# Patient Record
Sex: MALE | Race: WHITE | NOT HISPANIC OR LATINO
[De-identification: names, ages, dates, MRNs, and addresses within clinical notes are randomized per-mention and may not be internally consistent; named-entity substitution may affect disease eponyms.]

---

## 2023-09-25 ENCOUNTER — APPOINTMENT (OUTPATIENT)
Dept: UROLOGY | Facility: CLINIC | Age: 63
End: 2023-09-25
Payer: COMMERCIAL

## 2023-09-25 VITALS
HEIGHT: 76 IN | TEMPERATURE: 98 F | DIASTOLIC BLOOD PRESSURE: 76 MMHG | RESPIRATION RATE: 18 BRPM | SYSTOLIC BLOOD PRESSURE: 157 MMHG | OXYGEN SATURATION: 96 % | HEART RATE: 88 BPM | WEIGHT: 300 LBS | BODY MASS INDEX: 36.53 KG/M2

## 2023-09-25 DIAGNOSIS — Z78.9 OTHER SPECIFIED HEALTH STATUS: ICD-10-CM

## 2023-09-25 PROCEDURE — 99204 OFFICE O/P NEW MOD 45 MIN: CPT | Mod: 25

## 2023-09-25 PROCEDURE — 51702 INSERT TEMP BLADDER CATH: CPT

## 2023-09-26 LAB
APPEARANCE: CLEAR
BACTERIA: NEGATIVE /HPF
BILIRUBIN URINE: NEGATIVE
BLOOD URINE: ABNORMAL
CAST: 0 /LPF
COLOR: YELLOW
EPITHELIAL CELLS: 1 /HPF
GLUCOSE QUALITATIVE U: NEGATIVE MG/DL
KETONES URINE: NEGATIVE MG/DL
LEUKOCYTE ESTERASE URINE: NEGATIVE
MICROSCOPIC-UA: NORMAL
NITRITE URINE: NEGATIVE
PH URINE: 5.5
PROTEIN URINE: NEGATIVE MG/DL
RED BLOOD CELLS URINE: 2 /HPF
SPECIFIC GRAVITY URINE: 1.01
UROBILINOGEN URINE: 0.2 MG/DL
WHITE BLOOD CELLS URINE: 2 /HPF

## 2023-09-27 ENCOUNTER — APPOINTMENT (OUTPATIENT)
Dept: INTERNAL MEDICINE | Facility: CLINIC | Age: 63
End: 2023-09-27

## 2023-09-28 LAB — BACTERIA UR CULT: NORMAL

## 2023-10-02 ENCOUNTER — APPOINTMENT (OUTPATIENT)
Dept: UROLOGY | Facility: CLINIC | Age: 63
End: 2023-10-02
Payer: COMMERCIAL

## 2023-10-02 VITALS
OXYGEN SATURATION: 96 % | BODY MASS INDEX: 36.53 KG/M2 | HEIGHT: 76 IN | DIASTOLIC BLOOD PRESSURE: 68 MMHG | TEMPERATURE: 98 F | RESPIRATION RATE: 17 BRPM | WEIGHT: 300 LBS | SYSTOLIC BLOOD PRESSURE: 102 MMHG | HEART RATE: 81 BPM

## 2023-10-02 PROCEDURE — 99213 OFFICE O/P EST LOW 20 MIN: CPT | Mod: 25

## 2023-10-02 PROCEDURE — 51702 INSERT TEMP BLADDER CATH: CPT

## 2023-10-03 ENCOUNTER — APPOINTMENT (OUTPATIENT)
Dept: CT IMAGING | Facility: IMAGING CENTER | Age: 63
End: 2023-10-03
Payer: COMMERCIAL

## 2023-10-03 ENCOUNTER — OUTPATIENT (OUTPATIENT)
Dept: OUTPATIENT SERVICES | Facility: HOSPITAL | Age: 63
LOS: 1 days | End: 2023-10-03
Payer: COMMERCIAL

## 2023-10-03 ENCOUNTER — APPOINTMENT (OUTPATIENT)
Dept: ULTRASOUND IMAGING | Facility: IMAGING CENTER | Age: 63
End: 2023-10-03
Payer: COMMERCIAL

## 2023-10-03 ENCOUNTER — APPOINTMENT (OUTPATIENT)
Dept: UROLOGY | Facility: CLINIC | Age: 63
End: 2023-10-03
Payer: COMMERCIAL

## 2023-10-03 VITALS
HEIGHT: 76 IN | RESPIRATION RATE: 16 BRPM | BODY MASS INDEX: 36.53 KG/M2 | HEART RATE: 114 BPM | SYSTOLIC BLOOD PRESSURE: 193 MMHG | DIASTOLIC BLOOD PRESSURE: 98 MMHG | WEIGHT: 300 LBS

## 2023-10-03 VITALS — SYSTOLIC BLOOD PRESSURE: 140 MMHG | DIASTOLIC BLOOD PRESSURE: 76 MMHG | HEART RATE: 94 BPM

## 2023-10-03 DIAGNOSIS — N40.1 BENIGN PROSTATIC HYPERPLASIA WITH LOWER URINARY TRACT SYMPTOMS: ICD-10-CM

## 2023-10-03 DIAGNOSIS — R10.9 UNSPECIFIED ABDOMINAL PAIN: ICD-10-CM

## 2023-10-03 DIAGNOSIS — N13.30 UNSPECIFIED HYDRONEPHROSIS: ICD-10-CM

## 2023-10-03 PROCEDURE — 74178 CT ABD&PLV WO CNTR FLWD CNTR: CPT | Mod: 26

## 2023-10-03 PROCEDURE — 76770 US EXAM ABDO BACK WALL COMP: CPT | Mod: 26

## 2023-10-03 PROCEDURE — 74178 CT ABD&PLV WO CNTR FLWD CNTR: CPT

## 2023-10-03 PROCEDURE — 76770 US EXAM ABDO BACK WALL COMP: CPT

## 2023-10-03 PROCEDURE — 82565 ASSAY OF CREATININE: CPT

## 2023-10-03 PROCEDURE — 99215 OFFICE O/P EST HI 40 MIN: CPT

## 2023-10-04 LAB
APPEARANCE: CLEAR
BACTERIA: NEGATIVE /HPF
BILIRUBIN URINE: NEGATIVE
BLOOD URINE: ABNORMAL
CAST: 2 /LPF
COLOR: YELLOW
EPITHELIAL CELLS: 0 /HPF
GLUCOSE QUALITATIVE U: NEGATIVE MG/DL
KETONES URINE: NEGATIVE MG/DL
LEUKOCYTE ESTERASE URINE: ABNORMAL
MICROSCOPIC-UA: NORMAL
NITRITE URINE: NEGATIVE
PH URINE: 6.5
PROTEIN URINE: NORMAL MG/DL
RED BLOOD CELLS URINE: 13 /HPF
SPECIFIC GRAVITY URINE: 1.01
UROBILINOGEN URINE: 0.2 MG/DL
WHITE BLOOD CELLS URINE: 4 /HPF

## 2023-10-06 PROBLEM — R10.9 ABDOMINAL PAIN: Status: ACTIVE | Noted: 2023-10-06

## 2023-10-06 PROBLEM — N13.30 HYDRONEPHROSIS: Status: ACTIVE | Noted: 2023-10-06

## 2023-10-06 LAB — BACTERIA UR CULT: ABNORMAL

## 2023-10-16 ENCOUNTER — APPOINTMENT (OUTPATIENT)
Dept: UROLOGY | Facility: CLINIC | Age: 63
End: 2023-10-16
Payer: COMMERCIAL

## 2023-10-16 VITALS
TEMPERATURE: 98.2 F | DIASTOLIC BLOOD PRESSURE: 60 MMHG | OXYGEN SATURATION: 97 % | HEART RATE: 92 BPM | SYSTOLIC BLOOD PRESSURE: 98 MMHG

## 2023-10-16 PROCEDURE — 51784 ANAL/URINARY MUSCLE STUDY: CPT

## 2023-10-16 PROCEDURE — 51728 CYSTOMETROGRAM W/VP: CPT

## 2023-10-30 ENCOUNTER — APPOINTMENT (OUTPATIENT)
Dept: UROLOGY | Facility: CLINIC | Age: 63
End: 2023-10-30
Payer: COMMERCIAL

## 2023-10-30 VITALS
RESPIRATION RATE: 16 BRPM | BODY MASS INDEX: 36.53 KG/M2 | TEMPERATURE: 98.4 F | WEIGHT: 300 LBS | DIASTOLIC BLOOD PRESSURE: 75 MMHG | SYSTOLIC BLOOD PRESSURE: 151 MMHG | OXYGEN SATURATION: 96 % | HEART RATE: 83 BPM | HEIGHT: 76 IN

## 2023-10-30 PROCEDURE — 51702 INSERT TEMP BLADDER CATH: CPT

## 2023-11-09 ENCOUNTER — APPOINTMENT (OUTPATIENT)
Dept: INTERNAL MEDICINE | Facility: CLINIC | Age: 63
End: 2023-11-09
Payer: COMMERCIAL

## 2023-11-09 ENCOUNTER — LABORATORY RESULT (OUTPATIENT)
Age: 63
End: 2023-11-09

## 2023-11-09 ENCOUNTER — NON-APPOINTMENT (OUTPATIENT)
Age: 63
End: 2023-11-09

## 2023-11-09 VITALS
OXYGEN SATURATION: 98 % | DIASTOLIC BLOOD PRESSURE: 75 MMHG | HEART RATE: 105 BPM | HEIGHT: 76 IN | BODY MASS INDEX: 27.89 KG/M2 | WEIGHT: 229 LBS | SYSTOLIC BLOOD PRESSURE: 120 MMHG | TEMPERATURE: 96 F

## 2023-11-09 DIAGNOSIS — Z00.00 ENCOUNTER FOR GENERAL ADULT MEDICAL EXAMINATION W/OUT ABNORMAL FINDINGS: ICD-10-CM

## 2023-11-09 DIAGNOSIS — R29.898 OTHER SYMPTOMS AND SIGNS INVOLVING THE MUSCULOSKELETAL SYSTEM: ICD-10-CM

## 2023-11-09 DIAGNOSIS — Z12.11 ENCOUNTER FOR SCREENING FOR MALIGNANT NEOPLASM OF COLON: ICD-10-CM

## 2023-11-09 DIAGNOSIS — M25.562 PAIN IN RIGHT KNEE: ICD-10-CM

## 2023-11-09 DIAGNOSIS — M25.561 PAIN IN RIGHT KNEE: ICD-10-CM

## 2023-11-09 PROCEDURE — 93000 ELECTROCARDIOGRAM COMPLETE: CPT | Mod: 59

## 2023-11-09 PROCEDURE — G0444 DEPRESSION SCREEN ANNUAL: CPT | Mod: 59

## 2023-11-09 PROCEDURE — 36415 COLL VENOUS BLD VENIPUNCTURE: CPT

## 2023-11-09 PROCEDURE — 99386 PREV VISIT NEW AGE 40-64: CPT | Mod: 25

## 2023-11-09 PROCEDURE — G0447 BEHAVIOR COUNSEL OBESITY 15M: CPT | Mod: 59

## 2023-11-20 ENCOUNTER — APPOINTMENT (OUTPATIENT)
Dept: INTERNAL MEDICINE | Facility: CLINIC | Age: 63
End: 2023-11-20
Payer: COMMERCIAL

## 2023-11-20 VITALS
DIASTOLIC BLOOD PRESSURE: 77 MMHG | BODY MASS INDEX: 28.74 KG/M2 | HEART RATE: 85 BPM | SYSTOLIC BLOOD PRESSURE: 162 MMHG | OXYGEN SATURATION: 96 % | HEIGHT: 76 IN | WEIGHT: 236 LBS

## 2023-11-20 DIAGNOSIS — E55.9 VITAMIN D DEFICIENCY, UNSPECIFIED: ICD-10-CM

## 2023-11-20 DIAGNOSIS — R73.03 PREDIABETES.: ICD-10-CM

## 2023-11-20 LAB
25(OH)D3 SERPL-MCNC: 17.1 NG/ML
ALBUMIN SERPL ELPH-MCNC: 3.8 G/DL
ALP BLD-CCNC: 56 U/L
ALT SERPL-CCNC: 28 U/L
ANION GAP SERPL CALC-SCNC: 13 MMOL/L
APPEARANCE: ABNORMAL
AST SERPL-CCNC: 26 U/L
BASOPHILS # BLD AUTO: 0.07 K/UL
BASOPHILS NFR BLD AUTO: 1.3 %
BILIRUB SERPL-MCNC: 0.8 MG/DL
BILIRUBIN URINE: NEGATIVE
BLOOD URINE: ABNORMAL
BUN SERPL-MCNC: 19 MG/DL
CALCIUM SERPL-MCNC: 9.4 MG/DL
CHLORIDE SERPL-SCNC: 99 MMOL/L
CHOLEST SERPL-MCNC: 232 MG/DL
CO2 SERPL-SCNC: 26 MMOL/L
COLOR: YELLOW
CREAT SERPL-MCNC: 1.07 MG/DL
EGFR: 78 ML/MIN/1.73M2
EOSINOPHIL # BLD AUTO: 0.15 K/UL
EOSINOPHIL NFR BLD AUTO: 2.8 %
ESTIMATED AVERAGE GLUCOSE: 114 MG/DL
FERRITIN SERPL-MCNC: 609 NG/ML
GLUCOSE QUALITATIVE U: NEGATIVE MG/DL
GLUCOSE SERPL-MCNC: 82 MG/DL
HBA1C MFR BLD HPLC: 5.6 %
HCT VFR BLD CALC: 41.1 %
HDLC SERPL-MCNC: 49 MG/DL
HGB BLD-MCNC: 13 G/DL
IMM GRANULOCYTES NFR BLD AUTO: 0.4 %
KETONES URINE: NEGATIVE MG/DL
LDLC SERPL CALC-MCNC: 172 MG/DL
LEUKOCYTE ESTERASE URINE: ABNORMAL
LYMPHOCYTES # BLD AUTO: 1.21 K/UL
LYMPHOCYTES NFR BLD AUTO: 22.4 %
MAN DIFF?: NORMAL
MCHC RBC-ENTMCNC: 29.1 PG
MCHC RBC-ENTMCNC: 31.6 GM/DL
MCV RBC AUTO: 92.2 FL
MONOCYTES # BLD AUTO: 0.36 K/UL
MONOCYTES NFR BLD AUTO: 6.7 %
NEUTROPHILS # BLD AUTO: 3.59 K/UL
NEUTROPHILS NFR BLD AUTO: 66.4 %
NITRITE URINE: NEGATIVE
NONHDLC SERPL-MCNC: 183 MG/DL
PH URINE: 6
PLATELET # BLD AUTO: 355 K/UL
POTASSIUM SERPL-SCNC: 4.6 MMOL/L
PROT SERPL-MCNC: 7.4 G/DL
PROTEIN URINE: 100 MG/DL
PSA SERPL-MCNC: 4.39 NG/ML
RBC # BLD: 4.46 M/UL
RBC # FLD: 13.7 %
SODIUM SERPL-SCNC: 138 MMOL/L
SPECIFIC GRAVITY URINE: 1.02
TRIGL SERPL-MCNC: 63 MG/DL
TSH SERPL-ACNC: 1.54 UIU/ML
UROBILINOGEN URINE: 1 MG/DL
VIT B12 SERPL-MCNC: 501 PG/ML
WBC # FLD AUTO: 5.4 K/UL

## 2023-11-20 PROCEDURE — 99214 OFFICE O/P EST MOD 30 MIN: CPT

## 2023-11-27 ENCOUNTER — APPOINTMENT (OUTPATIENT)
Dept: UROLOGY | Facility: CLINIC | Age: 63
End: 2023-11-27

## 2023-11-27 VITALS
HEIGHT: 76 IN | TEMPERATURE: 97.7 F | DIASTOLIC BLOOD PRESSURE: 70 MMHG | OXYGEN SATURATION: 97 % | WEIGHT: 236 LBS | BODY MASS INDEX: 28.74 KG/M2 | HEART RATE: 85 BPM | SYSTOLIC BLOOD PRESSURE: 119 MMHG

## 2023-12-20 ENCOUNTER — APPOINTMENT (OUTPATIENT)
Dept: UROLOGY | Facility: CLINIC | Age: 63
End: 2023-12-20
Payer: COMMERCIAL

## 2023-12-20 VITALS
TEMPERATURE: 97.8 F | WEIGHT: 236 LBS | RESPIRATION RATE: 16 BRPM | BODY MASS INDEX: 28.74 KG/M2 | DIASTOLIC BLOOD PRESSURE: 69 MMHG | OXYGEN SATURATION: 99 % | SYSTOLIC BLOOD PRESSURE: 140 MMHG | HEIGHT: 76 IN | HEART RATE: 102 BPM

## 2023-12-20 DIAGNOSIS — R33.9 RETENTION OF URINE, UNSPECIFIED: ICD-10-CM

## 2023-12-20 PROCEDURE — 51797 INTRAABDOMINAL PRESSURE TEST: CPT

## 2023-12-20 PROCEDURE — 51741 ELECTRO-UROFLOWMETRY FIRST: CPT

## 2023-12-20 PROCEDURE — 51728 CYSTOMETROGRAM W/VP: CPT

## 2023-12-20 PROCEDURE — 51784 ANAL/URINARY MUSCLE STUDY: CPT

## 2024-01-17 ENCOUNTER — APPOINTMENT (OUTPATIENT)
Dept: UROLOGY | Facility: CLINIC | Age: 64
End: 2024-01-17
Payer: COMMERCIAL

## 2024-01-17 VITALS
DIASTOLIC BLOOD PRESSURE: 82 MMHG | OXYGEN SATURATION: 96 % | HEART RATE: 78 BPM | SYSTOLIC BLOOD PRESSURE: 163 MMHG | TEMPERATURE: 97.7 F

## 2024-01-17 PROCEDURE — 51702 INSERT TEMP BLADDER CATH: CPT

## 2024-01-22 ENCOUNTER — APPOINTMENT (OUTPATIENT)
Dept: UROLOGY | Facility: CLINIC | Age: 64
End: 2024-01-22
Payer: COMMERCIAL

## 2024-01-22 VITALS
SYSTOLIC BLOOD PRESSURE: 149 MMHG | WEIGHT: 236 LBS | DIASTOLIC BLOOD PRESSURE: 68 MMHG | RESPIRATION RATE: 17 BRPM | TEMPERATURE: 97.8 F | HEART RATE: 97 BPM | HEIGHT: 76 IN | BODY MASS INDEX: 28.74 KG/M2

## 2024-01-22 DIAGNOSIS — R33.9 RETENTION OF URINE, UNSPECIFIED: ICD-10-CM

## 2024-01-22 PROCEDURE — 99214 OFFICE O/P EST MOD 30 MIN: CPT

## 2024-01-28 PROBLEM — R33.9 INCOMPLETE EMPTYING OF BLADDER: Status: ACTIVE | Noted: 2023-10-06

## 2024-01-28 NOTE — HISTORY OF PRESENT ILLNESS
[FreeTextEntry1] : Referred by Dr. Patten solitary kidney with stones BPH, urinary retention Has indwelling Beckwith catheter PSA 4.39  nov 2023  I personally reviewed labs and images and discussed all pertinent findings with patient. CT: 70cc prostate; solitary Left kidney with multiple large stones  (1) Urinary retention Discussed Laser enucleation of prostate with morcellation (HOLEP/ThuLEP).  Indications, options including chronic Beckwith catheter, suprapubic catheter, intermittent self-catheterization, transurethral resection of prostate, transurethral vaporization of prostate with laser or electrocautery, open or laparoscopic enucleation of the prostate, all discussed.  Potential complications of transurethral holmium or thulium laser enucleation of prostate with morcellation discussed. This included risk of infection, bleeding, transfusion, conversion to open enucleation, need for staged procedure, adjacent organ injury, bladder injury, clot retention of urine requiring return to operating room for cystoscopy clot evacuation, prolonged duration of Beckwith catheterization, urethral stricture, transient or permanent urinary incontinence, retrograde ejaculation is a permanent and irreversible complication, redo or staged surgery due to equipment malfunction, anesthetic complications, cardiac complications, all discussed.   Printed information including of the above and other more uncommon complications provided to patient.  We'll schedule.  (2) Solitary kidney with stones Will need PCNL He wants to do after prostate enucleation will revisit after prostate enucleation

## 2024-02-07 ENCOUNTER — OUTPATIENT (OUTPATIENT)
Dept: OUTPATIENT SERVICES | Facility: HOSPITAL | Age: 64
LOS: 1 days | End: 2024-02-07
Payer: COMMERCIAL

## 2024-02-07 ENCOUNTER — APPOINTMENT (OUTPATIENT)
Dept: UROLOGY | Facility: CLINIC | Age: 64
End: 2024-02-07
Payer: COMMERCIAL

## 2024-02-07 VITALS
SYSTOLIC BLOOD PRESSURE: 117 MMHG | DIASTOLIC BLOOD PRESSURE: 77 MMHG | HEIGHT: 76 IN | HEART RATE: 79 BPM | RESPIRATION RATE: 16 BRPM | WEIGHT: 233.03 LBS | OXYGEN SATURATION: 98 % | TEMPERATURE: 99 F

## 2024-02-07 VITALS
TEMPERATURE: 98 F | WEIGHT: 236 LBS | HEIGHT: 76 IN | SYSTOLIC BLOOD PRESSURE: 118 MMHG | BODY MASS INDEX: 28.74 KG/M2 | HEART RATE: 84 BPM | DIASTOLIC BLOOD PRESSURE: 76 MMHG | RESPIRATION RATE: 16 BRPM | OXYGEN SATURATION: 98 %

## 2024-02-07 DIAGNOSIS — R33.9 RETENTION OF URINE, UNSPECIFIED: ICD-10-CM

## 2024-02-07 DIAGNOSIS — Z01.818 ENCOUNTER FOR OTHER PREPROCEDURAL EXAMINATION: ICD-10-CM

## 2024-02-07 DIAGNOSIS — E78.5 HYPERLIPIDEMIA, UNSPECIFIED: ICD-10-CM

## 2024-02-07 DIAGNOSIS — N40.1 BENIGN PROSTATIC HYPERPLASIA WITH LOWER URINARY TRACT SYMPTOMS: ICD-10-CM

## 2024-02-07 LAB
ANION GAP SERPL CALC-SCNC: 10 MMOL/L — SIGNIFICANT CHANGE UP (ref 5–17)
BLD GP AB SCN SERPL QL: NEGATIVE — SIGNIFICANT CHANGE UP
BUN SERPL-MCNC: 20 MG/DL — SIGNIFICANT CHANGE UP (ref 7–23)
CALCIUM SERPL-MCNC: 9.4 MG/DL — SIGNIFICANT CHANGE UP (ref 8.4–10.5)
CHLORIDE SERPL-SCNC: 102 MMOL/L — SIGNIFICANT CHANGE UP (ref 96–108)
CO2 SERPL-SCNC: 27 MMOL/L — SIGNIFICANT CHANGE UP (ref 22–31)
CREAT SERPL-MCNC: 1.09 MG/DL — SIGNIFICANT CHANGE UP (ref 0.5–1.3)
EGFR: 77 ML/MIN/1.73M2 — SIGNIFICANT CHANGE UP
GLUCOSE SERPL-MCNC: 92 MG/DL — SIGNIFICANT CHANGE UP (ref 70–99)
HCT VFR BLD CALC: 42.9 % — SIGNIFICANT CHANGE UP (ref 39–50)
HGB BLD-MCNC: 13.6 G/DL — SIGNIFICANT CHANGE UP (ref 13–17)
MCHC RBC-ENTMCNC: 28.8 PG — SIGNIFICANT CHANGE UP (ref 27–34)
MCHC RBC-ENTMCNC: 31.7 GM/DL — LOW (ref 32–36)
MCV RBC AUTO: 90.9 FL — SIGNIFICANT CHANGE UP (ref 80–100)
NRBC # BLD: 0 /100 WBCS — SIGNIFICANT CHANGE UP (ref 0–0)
PLATELET # BLD AUTO: 239 K/UL — SIGNIFICANT CHANGE UP (ref 150–400)
POTASSIUM SERPL-MCNC: 4.2 MMOL/L — SIGNIFICANT CHANGE UP (ref 3.5–5.3)
POTASSIUM SERPL-SCNC: 4.2 MMOL/L — SIGNIFICANT CHANGE UP (ref 3.5–5.3)
RBC # BLD: 4.72 M/UL — SIGNIFICANT CHANGE UP (ref 4.2–5.8)
RBC # FLD: 12.9 % — SIGNIFICANT CHANGE UP (ref 10.3–14.5)
RH IG SCN BLD-IMP: POSITIVE — SIGNIFICANT CHANGE UP
SODIUM SERPL-SCNC: 139 MMOL/L — SIGNIFICANT CHANGE UP (ref 135–145)
WBC # BLD: 5.35 K/UL — SIGNIFICANT CHANGE UP (ref 3.8–10.5)
WBC # FLD AUTO: 5.35 K/UL — SIGNIFICANT CHANGE UP (ref 3.8–10.5)

## 2024-02-07 PROCEDURE — 51702 INSERT TEMP BLADDER CATH: CPT

## 2024-02-07 PROCEDURE — 80048 BASIC METABOLIC PNL TOTAL CA: CPT

## 2024-02-07 PROCEDURE — 87086 URINE CULTURE/COLONY COUNT: CPT

## 2024-02-07 PROCEDURE — 85027 COMPLETE CBC AUTOMATED: CPT

## 2024-02-07 PROCEDURE — 86850 RBC ANTIBODY SCREEN: CPT

## 2024-02-07 PROCEDURE — 86901 BLOOD TYPING SEROLOGIC RH(D): CPT

## 2024-02-07 PROCEDURE — G0463: CPT

## 2024-02-07 PROCEDURE — 86900 BLOOD TYPING SEROLOGIC ABO: CPT

## 2024-02-07 NOTE — H&P PST ADULT - NSICDXPASTMEDICALHX_GEN_ALL_CORE_FT
PAST MEDICAL HISTORY:  BPH with urinary obstruction     Beckwith catheter in place     History of urinary retention     Hyperlipidemia

## 2024-02-07 NOTE — H&P PST ADULT - PROBLEM SELECTOR PLAN 1
Pt scheduled for cystoscopy, laser enucleation of prostate with morcellation with Dr. Pema Navarro on 2/20/2024  -Pre op instructions provided. Pt scheduled for cystoscopy, laser enucleation of prostate with morcellation with Dr. Pema Navarro on 2/20/2024  -Pre op instructions provided.  -LABS: CBC, BMP, T&S, urine culture done at [facility].  He will follow up with PMD for clearance.  PT seeing Neurologist in April 2024 for recent imbalance (since catheter was placed back in September 2023).  Pt will be starting on Cipro 3 days prior to procedure and will finish course of abx after surgery.

## 2024-02-07 NOTE — H&P PST ADULT - HISTORY OF PRESENT ILLNESS
64y/o M, PMHx of born with 1 kidney, BPH with obstruction, urinary retention with indwelling jiménez catheter, and HLD (on Atorvastatin), prediabetes (recent bloodwork 11/2023 - HgbA1c 5.6%.  Pt denies any other medical condition.  Pt presents for PST for cystoscopy, laser enucleation of prostate with morcellation with Dr. Pema Navarro on 2/20/2024.  Pt denies any fever, chills, N/V/D, SOB, CP, dizziness, HA, or BM issues.  Jiménez catheter in place. 62y/o M, PMHx of born with 1 kidney (incidental finding from CT scan), BPH with obstruction, had gross hematuria, urinary retention with indwelling jiménez catheter (since September 2023), and HLD (on Atorvastatin).  Since having catheter in place, some balance issue and ambulating with cane. Pt will be seeing a neurologist in April 2024.  PT has multiple kidney stones.  Pt denies any other medical condition.  Denies any pain or discomfort.  Pt presents for PST for cystoscopy, laser enucleation of prostate with morcellation with Dr. Pema Navarro on 2/20/2024.  Pt denies any fever, chills, N/V/D, SOB, CP, dizziness, HA, or BM issues.  Jiménez catheter in place.  Denies any dysuria, urgency, frequency, hematuria.  He will be going to urologist today for catheter change (urinary obstruction).    ***PT seeing Neurologist in April 2024 for recent imbalance (since catheter was placed back in September 2023).  Pt will be starting on Cipro 3 days prior to procedure and will finish course of abx after surgery. 64y/o M, PMHx of born with 1 kidney (incidental finding from CT scan), BPH with obstruction, had gross hematuria, urinary retention with indwelling jiménez catheter (since September 2023), and HLD (on Atorvastatin).  Since having catheter in place, some balance issue and ambulating with cane. Pt will be seeing a neurologist in April 2024.  PT has multiple kidney stones.  Denies any pain or discomfort.  PT have been losing weight.  He is not diabetic and 11/2023 HgbA1c 5.6%.  Pt presents for PST for cystoscopy, laser enucleation of prostate with morcellation with Dr. Pema Navarro on 2/20/2024.  Pt denies any fever, chills, N/V/D, SOB, CP, dizziness, HA, or BM issues.  Jiménez catheter in place.  Denies any dysuria, urgency, frequency, hematuria.  He will be going to urologist today for catheter change (urinary obstruction).    ***PT seeing Neurologist in April 2024 for recent imbalance (since catheter was placed back in September 2023).  Pt will be starting on Cipro 3 days prior to procedure and will finish course of abx after surgery. 64y/o M, PMH solitary left kidney (incidental finding from CT scan), BPH with obstruction, had gross hematuria, urinary retention with indwelling jiménez catheter (since September 2023), and HLD (on Atorvastatin).  Since having catheter in place, some balance issue and ambulating with cane. Pt will be seeing a neurologist in April 2024.  PT has multiple kidney stones.  Denies any pain or discomfort.  PT have been losing weight.  He is not diabetic and 11/2023 HgbA1c 5.6%.  Pt presents for PST for cystoscopy, laser enucleation of prostate with morcellation with Dr. Pema Navarro on 2/20/2024.  Pt denies any fever, chills, N/V/D, SOB, CP, dizziness, HA, or BM issues.  Jiménez catheter in place.  Denies any dysuria, urgency, frequency, hematuria.  He will be going to urologist today for catheter change (urinary obstruction).    ***PT seeing Neurologist in April 2024 for recent imbalance (since catheter was placed back in September 2023).  Pt will be starting on Cipro 3 days prior to procedure and will finish course of abx after surgery.

## 2024-02-07 NOTE — H&P PST ADULT - NEGATIVE GENERAL GENITOURINARY SYMPTOMS
Beckwith catheter in place.  Hx of urinary retention, Hx of BPH/no hematuria/no renal colic/no flank pain L/no flank pain R/no bladder infections

## 2024-02-07 NOTE — H&P PST ADULT - ASSESSMENT
DASI score: 6.70  DASI activity: able to walk 2-3 blocks, incline/stairs, shopping, carry groceries, house chores, self care without SOB, CP.  Loose teeth or denture: denies any loose teeth, implants, dentures

## 2024-02-08 ENCOUNTER — NON-APPOINTMENT (OUTPATIENT)
Age: 64
End: 2024-02-08

## 2024-02-09 ENCOUNTER — NON-APPOINTMENT (OUTPATIENT)
Age: 64
End: 2024-02-09

## 2024-02-09 ENCOUNTER — EMERGENCY (EMERGENCY)
Facility: HOSPITAL | Age: 64
LOS: 1 days | Discharge: HOME | End: 2024-02-09
Attending: STUDENT IN AN ORGANIZED HEALTH CARE EDUCATION/TRAINING PROGRAM
Payer: COMMERCIAL

## 2024-02-09 VITALS
HEART RATE: 72 BPM | SYSTOLIC BLOOD PRESSURE: 166 MMHG | TEMPERATURE: 98 F | RESPIRATION RATE: 18 BRPM | DIASTOLIC BLOOD PRESSURE: 81 MMHG | OXYGEN SATURATION: 99 %

## 2024-02-09 VITALS
OXYGEN SATURATION: 100 % | HEART RATE: 70 BPM | DIASTOLIC BLOOD PRESSURE: 81 MMHG | SYSTOLIC BLOOD PRESSURE: 162 MMHG | RESPIRATION RATE: 22 BRPM | TEMPERATURE: 98 F | HEIGHT: 76 IN | WEIGHT: 315 LBS

## 2024-02-09 PROBLEM — Z97.8 PRESENCE OF OTHER SPECIFIED DEVICES: Chronic | Status: ACTIVE | Noted: 2024-02-07

## 2024-02-09 PROBLEM — E78.5 HYPERLIPIDEMIA, UNSPECIFIED: Chronic | Status: ACTIVE | Noted: 2024-02-07

## 2024-02-09 PROBLEM — N40.1 BENIGN PROSTATIC HYPERPLASIA WITH LOWER URINARY TRACT SYMPTOMS: Chronic | Status: ACTIVE | Noted: 2024-02-07

## 2024-02-09 PROBLEM — Z87.898 PERSONAL HISTORY OF OTHER SPECIFIED CONDITIONS: Chronic | Status: ACTIVE | Noted: 2024-02-07

## 2024-02-09 LAB
APPEARANCE UR: ABNORMAL
BACTERIA # UR AUTO: ABNORMAL /HPF
BILIRUB UR-MCNC: NEGATIVE — SIGNIFICANT CHANGE UP
CAST: 1 /LPF — SIGNIFICANT CHANGE UP (ref 0–4)
COLOR SPEC: YELLOW — SIGNIFICANT CHANGE UP
CULTURE RESULTS: SIGNIFICANT CHANGE UP
DIFF PNL FLD: ABNORMAL
GLUCOSE UR QL: NEGATIVE MG/DL — SIGNIFICANT CHANGE UP
KETONES UR-MCNC: NEGATIVE MG/DL — SIGNIFICANT CHANGE UP
LEUKOCYTE ESTERASE UR-ACNC: ABNORMAL
NITRITE UR-MCNC: POSITIVE
PH UR: 6.5 — SIGNIFICANT CHANGE UP (ref 5–8)
PROT UR-MCNC: SIGNIFICANT CHANGE UP MG/DL
RBC CASTS # UR COMP ASSIST: 285 /HPF — HIGH (ref 0–4)
SP GR SPEC: 1.02 — SIGNIFICANT CHANGE UP (ref 1–1.03)
SPECIMEN SOURCE: SIGNIFICANT CHANGE UP
SQUAMOUS # UR AUTO: 0 /HPF — SIGNIFICANT CHANGE UP (ref 0–5)
UROBILINOGEN FLD QL: 0.2 MG/DL — SIGNIFICANT CHANGE UP (ref 0.2–1)
WBC UR QL: 335 /HPF — HIGH (ref 0–5)

## 2024-02-09 PROCEDURE — 87186 SC STD MICRODIL/AGAR DIL: CPT

## 2024-02-09 PROCEDURE — 87077 CULTURE AEROBIC IDENTIFY: CPT

## 2024-02-09 PROCEDURE — 99284 EMERGENCY DEPT VISIT MOD MDM: CPT

## 2024-02-09 PROCEDURE — 87086 URINE CULTURE/COLONY COUNT: CPT

## 2024-02-09 PROCEDURE — 81001 URINALYSIS AUTO W/SCOPE: CPT

## 2024-02-09 RX ORDER — CEFPODOXIME PROXETIL 100 MG
1 TABLET ORAL
Qty: 20 | Refills: 0
Start: 2024-02-09 | End: 2024-02-18

## 2024-02-09 RX ORDER — CEFPODOXIME PROXETIL 100 MG
100 TABLET ORAL ONCE
Refills: 0 | Status: COMPLETED | OUTPATIENT
Start: 2024-02-09 | End: 2024-02-09

## 2024-02-09 RX ADMIN — Medication 100 MILLIGRAM(S): at 02:57

## 2024-02-09 NOTE — ED PROVIDER NOTE - PROGRESS NOTE DETAILS
UA nitrite and leuk esterase positive.  Will treat patient for UTI.  Will send cefpodoxime to pharmacy.  Given patient return precautions and follow-up instructions with teach back.  Plan for DC. Leobardo Salazar, ED Attending

## 2024-02-09 NOTE — ED PROVIDER NOTE - NSFOLLOWUPINSTRUCTIONS_ED_ALL_ED_FT
See attached information on Beckwith catheter care.      Follow-up with your urologist in the next 2 to 3 days as discussed.      Return to the emergency room for any new or worsening symptoms.       your antibiotics and take as directed.     Indwelling Urinary Catheter Care, Adult    An indwelling urinary catheter is a thin, flexible tube that is placed into the bladder to help drain urine out of the body. The catheter is inserted into the urethra. The urethra is the part of the body that drains urine from the bladder. Urine drains from the catheter into a drainage bag outside of the body.    Taking good care of your catheter will keep it working properly and help to prevent problems from developing.    What are the risks?  Bacteria may get into your bladder and cause a urinary tract infection.  Urine flow can become blocked. This can happen if the catheter is not working correctly, or if you have sediment or a blood clot in your bladder or catheter.  Tissue near the catheter may become irritated and may bleed.  How to wear your catheter and your drainage bag  Supplies needed    Adhesive tape or a leg strap.  Alcohol wipe or soap and water (if you use tape).  A clean towel (if you use tape).  Overnight drainage bag.  Smaller drainage bag (leg bag).  Wearing your catheter and bag    Use adhesive tape or a leg strap to attach your catheter to your leg.  Make sure the catheter is not pulled tight.  If a leg strap gets wet, replace it with a dry one.  If you use adhesive tape:  Use an alcohol wipe or soap and water to wash off any stickiness on your skin where you had tape before.  Use a clean towel to pat-dry the area.  Apply the new tape.  You should have received a large overnight drainage bag and a smaller leg bag that fits underneath clothing.  You may wear the overnight bag at any time, but you should not wear the leg bag at night.  Make sure the overnight drainage bag is always lower than the level of your bladder, but do not let it touch the floor. Before you go to sleep, hang the bag inside a wastebasket that is covered by a clean plastic bag.  Secure the leg bag according to 's instructions. This may be above or below the knee, depending on the length of the tubing. Make sure that:  The leg bag is below the bladder.  The tubing does not have loops or too much tension.  How to care for the skin around the catheter  Supplies needed    A clean washcloth.  Water and mild soap.  A clean towel.  Caring for your skin and catheter    Female anatomy showing the labia, urethra, and an indwelling urinary catheter in the bladder.  Male anatomy showing the penis, urethra, and an indwelling urinary catheter in the bladder.  Every day, use a clean washcloth and soapy water to clean the skin around your catheter.  Wash your hands with soap and water.  Wet a washcloth in warm water and mild soap.  Clean the skin around your urethra.  If you are female:  Use one hand to gently spread the folds of skin around your vagina (labia).  With the washcloth in your other hand, wipe the inner side of your labia on each side. Do this in a front-to-back direction.  If you are male:  Use one hand to pull back any skin that covers the end of your penis (foreskin).  With the washcloth in your other hand, wipe your penis in small circles. Start wiping at the tip of your penis, then move outward from the catheter.  Move the foreskin back in place, if needed.  With your free hand, hold the catheter close to where it enters your body. Keep holding the catheter during cleaning so it does not get pulled out.  Use your other hand to clean the catheter with the washcloth.  Only wipe downward on the catheter, toward the bag.  Do not wipe upward toward your body, because that may push bacteria into your urethra and cause infection.  Use a clean towel to pat-dry the catheter and the skin around it. Make sure to wipe off all soap.  Wash your hands with soap and water.  Shower every day. Do not take baths.  Do not use cream, ointment, or lotion on the area where the catheter enters your body, unless your health care provider tells you to do that.  Do not use powders, sprays, or lotions on your genital area.  Check your skin around the catheter every day for signs of infection. Check for:  Redness, swelling, or pain.  Fluid or blood.  Warmth.  Pus or a bad smell.  How to empty the drainage bag  Supplies needed    Rubbing alcohol.  Gauze pad or cotton ball.  Adhesive tape or a leg strap.  Emptying the bag    Empty your drainage bag (your overnight drainage bag or your leg bag) when it is ?–½ full, or at least 2–3 times a day. Clean the drainage bag according to the 's instructions or as told by your health care provider.  Wash your hands with soap and water.  Detach the drainage bag from your leg.  Hold the drainage bag over the toilet or a clean container. Make sure the drainage bag is lower than your hips and bladder. This stops urine from going back into the tubing and into your bladder.  Open the pour spout at the bottom of the bag.  Empty the urine into the toilet or container. Do not let the pour spout touch any surface. This precaution is important to prevent bacteria from getting in the bag and causing infection.  Apply rubbing alcohol to a gauze pad or cotton ball.  Use the gauze pad or cotton ball to clean the pour spout.  Close the pour spout.  Attach the bag to your leg with adhesive tape or a leg strap.  Wash your hands with soap and water.  How to change the drainage bag  Supplies needed:    Alcohol wipes.  A clean drainage bag.  Adhesive tape or a leg strap.  Changing the bag    Replace your drainage bag with a clean bag if it leaks, starts to smell bad, or looks dirty.  Wash your hands with soap and water.  Detach the dirty drainage bag from your leg.  Pinch the catheter with your fingers so that urine does not spill out.  Disconnect the catheter tube from the drainage tube at the connection valve. Do not let the tubes touch any surface.  Clean the end of the catheter tube with an alcohol wipe. Use a different alcohol wipe to clean the end of the drainage tube.  Connect the catheter tube to the drainage tube of the clean bag.  Attach the clean bag to your leg with adhesive tape or a leg strap. Avoid attaching the new bag too tightly.  Wash your hands with soap and water.  General instructions  A person washing hands with soap and water.  Never pull on your catheter or try to remove it. Pulling can damage your internal tissues.  Always wash your hands before and after you handle your catheter or drainage bag. Use a mild, fragrance-free soap. If soap and water are not available, use hand .  Always make sure there are no twists, bends, or kinks in the catheter tube.  Always make sure there are no leaks in the catheter or drainage bag.  Drink enough fluid to keep your urine pale yellow.  Do not take baths, swim, or use a hot tub.  If you are female, wipe from front to back after having a bowel movement.  Contact a health care provider if:  Your catheter gets clogged.  Your catheter starts to leak.  You have signs of infection at the catheter site, such as:  Redness, swelling, or pain where the catheter enters your body.  Fluid, blood, pus, or a bad smell coming from the area where the catheter enters your body.  The area where the catheter enters your body feels warm to the touch.  You have signs of a urinary tract infection, such as:  Fever or chills.  Urine smells unusually bad.  Cloudy urine.  Pain in your abdomen, legs, lower back, or bladder.  Nausea or vomiting.  Get help right away if:  You see blood in the catheter.  Your urine is pink or red.  Your bladder feels full.  Your urine is not draining into the bag.  Your catheter gets pulled out.  Summary  An indwelling urinary catheter is a thin, flexible tube that is placed into the bladder to help drain urine out of the body.  The catheter is inserted into the part of the body that drains urine from the bladder (urethra).  Take good care of your catheter to keep it working properly and help prevent problems from developing.  Always wash your hands before and after you handle your catheter or drainage bag.  Never pull on your catheter or try to remove it.  This information is not intended to replace advice given to you by your health care provider. Make sure you discuss any questions you have with your health care provider.

## 2024-02-09 NOTE — ED PROVIDER NOTE - OBJECTIVE STATEMENT
63-year-old male with past medical history of hyperlipidemia, BPH, plan for TURP later this month presenting with chief complaint of Beckwith bag not draining.  States that he just had the catheter placed yesterday at urology office.  Has been Beckwith dependent for the past 4 months.  States that the bag stopped draining around sometime in the evening and started developing suprapubic pressure.  Denies any flank pain, abdominal pain, nausea, vomiting, fevers or chills.  No other complaints at this time.

## 2024-02-09 NOTE — ED PROVIDER NOTE - PATIENT PORTAL LINK FT
You can access the FollowMyHealth Patient Portal offered by Health system by registering at the following website: [email or web address]. By joining [facility]’s FollowMyHealth portal, you will also be able to view your health information using other applications (apps) compatible with our system.

## 2024-02-09 NOTE — ED ADULT NURSE REASSESSMENT NOTE - NS ED NURSE REASSESS COMMENT FT1
Indwelling urinary "jiménez" catheter inserted using sterile technique. Procedure, risks, and benefits of catheter explained to patient, patient verbalized understanding. Second RN present to confirm sterility. Pt tolerated well. Urinary catheter drained clear le urine, no clots visualized. Bedside drainage to gravity.

## 2024-02-09 NOTE — ED PROVIDER NOTE - PHYSICAL EXAMINATION
Const: Well-nourished, Well-developed, appearing stated age.  Eyes: no conjunctival injection, and symmetrical lids.  HEENT: Head NCAT, no lesions. Atraumatic external nose and ears.   Neck: Symmetric, trachea midline.   CVS: +S1/S2, Radial and DP pulses 2+ b/l.   RESP: Unlabored respiratory effort. Clear to auscultation bilaterally.  GI: +suprapubic ttp, No CVA tenderness b/l.   MSK: Normocephalic/Atraumatic, Lower Extremities w/o edema b/l.   Skin: Warm, dry and intact.   Neuro: Fluent Speech. Moving all extremities.   Psych: Awake, Alert, & Oriented (AAO) x3. Appropriate mood and affect.

## 2024-02-09 NOTE — ED ADULT NURSE NOTE - OBJECTIVE STATEMENT
64 y/o M with PMHx of BPH presents to the ED with complaints of chronic jiménez catheter not draining. Patient has chronic jiménez since September 2023, notes had jiménez replaced yesterday at urology office, however notes jiménez stopped draining at approximately 1800 last night and since developed suprapubic pressure and hematuria. AOx4 and speaking coherently. Breathing is unlabored, spontaneous, and symmetrical. Bladder is distended and tender to palpation. <2s capillary refill.

## 2024-02-09 NOTE — ED PROVIDER NOTE - CLINICAL SUMMARY MEDICAL DECISION MAKING FREE TEXT BOX
61-year-old male presenting with urinary retention.  States that Beckwith catheter is no longer draining.  On exam, vital signs stable, mild reproducible suprapubic tenderness to palpation.  On bedside POCUS, significant urinary retention.  No evidence of clotting, balloon not clearly visualized inside the bladder.  Plan on exchanging Beckwith.  Will send off UA UC to assess for infection.  Reassess to dispo.

## 2024-02-12 ENCOUNTER — APPOINTMENT (OUTPATIENT)
Dept: INTERNAL MEDICINE | Facility: CLINIC | Age: 64
End: 2024-02-12
Payer: COMMERCIAL

## 2024-02-12 ENCOUNTER — APPOINTMENT (OUTPATIENT)
Dept: UROLOGY | Facility: CLINIC | Age: 64
End: 2024-02-12

## 2024-02-12 VITALS
HEART RATE: 98 BPM | TEMPERATURE: 96 F | WEIGHT: 238 LBS | DIASTOLIC BLOOD PRESSURE: 70 MMHG | BODY MASS INDEX: 28.98 KG/M2 | HEIGHT: 76 IN | OXYGEN SATURATION: 98 % | SYSTOLIC BLOOD PRESSURE: 110 MMHG

## 2024-02-12 DIAGNOSIS — E78.5 HYPERLIPIDEMIA, UNSPECIFIED: ICD-10-CM

## 2024-02-12 DIAGNOSIS — Z01.818 ENCOUNTER FOR OTHER PREPROCEDURAL EXAMINATION: ICD-10-CM

## 2024-02-12 LAB
-  AMOXICILLIN/CLAVULANIC ACID: SIGNIFICANT CHANGE UP
-  AMPICILLIN/SULBACTAM: SIGNIFICANT CHANGE UP
-  AMPICILLIN: SIGNIFICANT CHANGE UP
-  AZTREONAM: SIGNIFICANT CHANGE UP
-  CEFAZOLIN: SIGNIFICANT CHANGE UP
-  CEFEPIME: SIGNIFICANT CHANGE UP
-  CEFOXITIN: SIGNIFICANT CHANGE UP
-  CEFTRIAXONE: SIGNIFICANT CHANGE UP
-  CIPROFLOXACIN: SIGNIFICANT CHANGE UP
-  ERTAPENEM: SIGNIFICANT CHANGE UP
-  GENTAMICIN: SIGNIFICANT CHANGE UP
-  IMIPENEM: SIGNIFICANT CHANGE UP
-  LEVOFLOXACIN: SIGNIFICANT CHANGE UP
-  MEROPENEM: SIGNIFICANT CHANGE UP
-  NITROFURANTOIN: SIGNIFICANT CHANGE UP
-  PIPERACILLIN/TAZOBACTAM: SIGNIFICANT CHANGE UP
-  TOBRAMYCIN: SIGNIFICANT CHANGE UP
-  TRIMETHOPRIM/SULFAMETHOXAZOLE: SIGNIFICANT CHANGE UP
CULTURE RESULTS: ABNORMAL
METHOD TYPE: SIGNIFICANT CHANGE UP
ORGANISM # SPEC MICROSCOPIC CNT: ABNORMAL
ORGANISM # SPEC MICROSCOPIC CNT: ABNORMAL
SPECIMEN SOURCE: SIGNIFICANT CHANGE UP

## 2024-02-12 PROCEDURE — 99214 OFFICE O/P EST MOD 30 MIN: CPT

## 2024-02-12 NOTE — HISTORY OF PRESENT ILLNESS
[No Pertinent Cardiac History] : no history of aortic stenosis, atrial fibrillation, coronary artery disease, recent myocardial infarction, or implantable device/pacemaker [No Pertinent Pulmonary History] : no history of asthma, COPD, sleep apnea, or smoking [No Adverse Anesthesia Reaction] : no adverse anesthesia reaction in self or family member [Chronic Anticoagulation] : no chronic anticoagulation [Chronic Kidney Disease] : no chronic kidney disease [Diabetes] : no diabetes [(Patient denies any chest pain, claudication, dyspnea on exertion, orthopnea, palpitations or syncope)] : Patient denies any chest pain, claudication, dyspnea on exertion, orthopnea, palpitations or syncope [FreeTextEntry1] : PROSTATE [FreeTextEntry2] : 02/20/2024 [FreeTextEntry3] : DR KENT [FreeTextEntry4] : HAS UTI AND ON ANTIBIOTICS AND AS PER SURG ITS OK

## 2024-02-12 NOTE — PHYSICAL EXAM
[Normal TMs] : both tympanic membranes were normal [Supple] : supple [Clear to Auscultation] : lungs were clear to auscultation bilaterally [Normal S1, S2] : normal S1 and S2 [Normal] : affect was normal and insight and judgment were intact

## 2024-02-19 ENCOUNTER — TRANSCRIPTION ENCOUNTER (OUTPATIENT)
Age: 64
End: 2024-02-19

## 2024-02-20 ENCOUNTER — RESULT REVIEW (OUTPATIENT)
Age: 64
End: 2024-02-20

## 2024-02-20 ENCOUNTER — APPOINTMENT (OUTPATIENT)
Dept: UROLOGY | Facility: HOSPITAL | Age: 64
End: 2024-02-20

## 2024-02-20 ENCOUNTER — INPATIENT (INPATIENT)
Facility: HOSPITAL | Age: 64
LOS: 0 days | Discharge: HOME | DRG: 714 | End: 2024-02-21
Attending: UROLOGY | Admitting: UROLOGY
Payer: COMMERCIAL

## 2024-02-20 VITALS
RESPIRATION RATE: 20 BRPM | WEIGHT: 233.03 LBS | DIASTOLIC BLOOD PRESSURE: 69 MMHG | TEMPERATURE: 98 F | HEIGHT: 76 IN | OXYGEN SATURATION: 96 % | HEART RATE: 86 BPM | SYSTOLIC BLOOD PRESSURE: 148 MMHG

## 2024-02-20 DIAGNOSIS — R33.9 RETENTION OF URINE, UNSPECIFIED: ICD-10-CM

## 2024-02-20 DIAGNOSIS — N40.1 BENIGN PROSTATIC HYPERPLASIA WITH LOWER URINARY TRACT SYMPTOMS: ICD-10-CM

## 2024-02-20 LAB
ANION GAP SERPL CALC-SCNC: 11 MMOL/L — SIGNIFICANT CHANGE UP (ref 5–17)
ANION GAP SERPL CALC-SCNC: 12 MMOL/L — SIGNIFICANT CHANGE UP (ref 5–17)
BASOPHILS # BLD AUTO: 0.03 K/UL — SIGNIFICANT CHANGE UP (ref 0–0.2)
BASOPHILS NFR BLD AUTO: 0.3 % — SIGNIFICANT CHANGE UP (ref 0–2)
BUN SERPL-MCNC: 20 MG/DL — SIGNIFICANT CHANGE UP (ref 7–23)
BUN SERPL-MCNC: 23 MG/DL — SIGNIFICANT CHANGE UP (ref 7–23)
CALCIUM SERPL-MCNC: 7.4 MG/DL — LOW (ref 8.4–10.5)
CALCIUM SERPL-MCNC: 8.7 MG/DL — SIGNIFICANT CHANGE UP (ref 8.4–10.5)
CHLORIDE SERPL-SCNC: 104 MMOL/L — SIGNIFICANT CHANGE UP (ref 96–108)
CHLORIDE SERPL-SCNC: 109 MMOL/L — HIGH (ref 96–108)
CO2 SERPL-SCNC: 18 MMOL/L — LOW (ref 22–31)
CO2 SERPL-SCNC: 22 MMOL/L — SIGNIFICANT CHANGE UP (ref 22–31)
CREAT SERPL-MCNC: 1.21 MG/DL — SIGNIFICANT CHANGE UP (ref 0.5–1.3)
CREAT SERPL-MCNC: 1.36 MG/DL — HIGH (ref 0.5–1.3)
EGFR: 59 ML/MIN/1.73M2 — LOW
EGFR: 68 ML/MIN/1.73M2 — SIGNIFICANT CHANGE UP
EOSINOPHIL # BLD AUTO: 0.02 K/UL — SIGNIFICANT CHANGE UP (ref 0–0.5)
EOSINOPHIL NFR BLD AUTO: 0.2 % — SIGNIFICANT CHANGE UP (ref 0–6)
GLUCOSE SERPL-MCNC: 118 MG/DL — HIGH (ref 70–99)
GLUCOSE SERPL-MCNC: 166 MG/DL — HIGH (ref 70–99)
HCT VFR BLD CALC: 38.4 % — LOW (ref 39–50)
HGB BLD-MCNC: 12.4 G/DL — LOW (ref 13–17)
IMM GRANULOCYTES NFR BLD AUTO: 0.6 % — SIGNIFICANT CHANGE UP (ref 0–0.9)
LYMPHOCYTES # BLD AUTO: 0.29 K/UL — LOW (ref 1–3.3)
LYMPHOCYTES # BLD AUTO: 2.8 % — LOW (ref 13–44)
MAGNESIUM SERPL-MCNC: 2.1 MG/DL — SIGNIFICANT CHANGE UP (ref 1.6–2.6)
MCHC RBC-ENTMCNC: 29.6 PG — SIGNIFICANT CHANGE UP (ref 27–34)
MCHC RBC-ENTMCNC: 32.3 GM/DL — SIGNIFICANT CHANGE UP (ref 32–36)
MCV RBC AUTO: 91.6 FL — SIGNIFICANT CHANGE UP (ref 80–100)
MONOCYTES # BLD AUTO: 0.11 K/UL — SIGNIFICANT CHANGE UP (ref 0–0.9)
MONOCYTES NFR BLD AUTO: 1.1 % — LOW (ref 2–14)
NEUTROPHILS # BLD AUTO: 9.91 K/UL — HIGH (ref 1.8–7.4)
NEUTROPHILS NFR BLD AUTO: 95 % — HIGH (ref 43–77)
NRBC # BLD: 0 /100 WBCS — SIGNIFICANT CHANGE UP (ref 0–0)
PHOSPHATE SERPL-MCNC: 3.2 MG/DL — SIGNIFICANT CHANGE UP (ref 2.5–4.5)
PLATELET # BLD AUTO: 216 K/UL — SIGNIFICANT CHANGE UP (ref 150–400)
POTASSIUM SERPL-MCNC: 4.4 MMOL/L — SIGNIFICANT CHANGE UP (ref 3.5–5.3)
POTASSIUM SERPL-MCNC: 4.4 MMOL/L — SIGNIFICANT CHANGE UP (ref 3.5–5.3)
POTASSIUM SERPL-SCNC: 4.4 MMOL/L — SIGNIFICANT CHANGE UP (ref 3.5–5.3)
POTASSIUM SERPL-SCNC: 4.4 MMOL/L — SIGNIFICANT CHANGE UP (ref 3.5–5.3)
RBC # BLD: 4.19 M/UL — LOW (ref 4.2–5.8)
RBC # FLD: 13.1 % — SIGNIFICANT CHANGE UP (ref 10.3–14.5)
RH IG SCN BLD-IMP: POSITIVE — SIGNIFICANT CHANGE UP
SODIUM SERPL-SCNC: 137 MMOL/L — SIGNIFICANT CHANGE UP (ref 135–145)
SODIUM SERPL-SCNC: 139 MMOL/L — SIGNIFICANT CHANGE UP (ref 135–145)
WBC # BLD: 10.42 K/UL — SIGNIFICANT CHANGE UP (ref 3.8–10.5)
WBC # FLD AUTO: 10.42 K/UL — SIGNIFICANT CHANGE UP (ref 3.8–10.5)

## 2024-02-20 PROCEDURE — 52649 PROSTATE LASER ENUCLEATION: CPT

## 2024-02-20 PROCEDURE — 52315 CYSTOSCOPY AND TREATMENT: CPT | Mod: 59

## 2024-02-20 PROCEDURE — 71045 X-RAY EXAM CHEST 1 VIEW: CPT | Mod: 26

## 2024-02-20 PROCEDURE — 88305 TISSUE EXAM BY PATHOLOGIST: CPT | Mod: 26

## 2024-02-20 PROCEDURE — 88300 SURGICAL PATH GROSS: CPT | Mod: 26,59

## 2024-02-20 DEVICE — MOCELLATOR ROTATION SINGLEUSE 4.75: Type: IMPLANTABLE DEVICE | Status: FUNCTIONAL

## 2024-02-20 DEVICE — LASER FIBER SOLTIVE 550: Type: IMPLANTABLE DEVICE | Status: FUNCTIONAL

## 2024-02-20 RX ORDER — ACETAMINOPHEN 500 MG
1000 TABLET ORAL EVERY 6 HOURS
Refills: 0 | Status: DISCONTINUED | OUTPATIENT
Start: 2024-02-20 | End: 2024-02-21

## 2024-02-20 RX ORDER — OXYCODONE HYDROCHLORIDE 5 MG/1
5 TABLET ORAL EVERY 4 HOURS
Refills: 0 | Status: DISCONTINUED | OUTPATIENT
Start: 2024-02-20 | End: 2024-02-21

## 2024-02-20 RX ORDER — FUROSEMIDE 40 MG
10 TABLET ORAL ONCE
Refills: 0 | Status: COMPLETED | OUTPATIENT
Start: 2024-02-20 | End: 2024-02-20

## 2024-02-20 RX ORDER — HEPARIN SODIUM 5000 [USP'U]/ML
5000 INJECTION INTRAVENOUS; SUBCUTANEOUS EVERY 8 HOURS
Refills: 0 | Status: DISCONTINUED | OUTPATIENT
Start: 2024-02-20 | End: 2024-02-21

## 2024-02-20 RX ORDER — FINASTERIDE 5 MG/1
5 TABLET, FILM COATED ORAL DAILY
Refills: 0 | Status: DISCONTINUED | OUTPATIENT
Start: 2024-02-20 | End: 2024-02-21

## 2024-02-20 RX ORDER — LIDOCAINE HCL 20 MG/ML
0.2 VIAL (ML) INJECTION ONCE
Refills: 0 | Status: DISCONTINUED | OUTPATIENT
Start: 2024-02-20 | End: 2024-02-20

## 2024-02-20 RX ORDER — ATORVASTATIN CALCIUM 80 MG/1
10 TABLET, FILM COATED ORAL AT BEDTIME
Refills: 0 | Status: DISCONTINUED | OUTPATIENT
Start: 2024-02-20 | End: 2024-02-21

## 2024-02-20 RX ORDER — SODIUM CHLORIDE 9 MG/ML
3 INJECTION INTRAMUSCULAR; INTRAVENOUS; SUBCUTANEOUS EVERY 8 HOURS
Refills: 0 | Status: DISCONTINUED | OUTPATIENT
Start: 2024-02-20 | End: 2024-02-20

## 2024-02-20 RX ORDER — INFLUENZA VIRUS VACCINE 15; 15; 15; 15 UG/.5ML; UG/.5ML; UG/.5ML; UG/.5ML
0.5 SUSPENSION INTRAMUSCULAR ONCE
Refills: 0 | Status: DISCONTINUED | OUTPATIENT
Start: 2024-02-20 | End: 2024-02-21

## 2024-02-20 RX ORDER — CIPROFLOXACIN LACTATE 400MG/40ML
500 VIAL (ML) INTRAVENOUS EVERY 12 HOURS
Refills: 0 | Status: DISCONTINUED | OUTPATIENT
Start: 2024-02-20 | End: 2024-02-21

## 2024-02-20 RX ORDER — FUROSEMIDE 40 MG
10 TABLET ORAL ONCE
Refills: 0 | Status: COMPLETED | OUTPATIENT
Start: 2024-02-21 | End: 2024-02-21

## 2024-02-20 RX ORDER — CEFAZOLIN SODIUM 1 G
2000 VIAL (EA) INJECTION ONCE
Refills: 0 | Status: COMPLETED | OUTPATIENT
Start: 2024-02-20 | End: 2024-02-20

## 2024-02-20 RX ORDER — DIPHENHYDRAMINE HCL 50 MG
12.5 CAPSULE ORAL ONCE
Refills: 0 | Status: COMPLETED | OUTPATIENT
Start: 2024-02-20 | End: 2024-02-20

## 2024-02-20 RX ORDER — SODIUM CHLORIDE 9 MG/ML
1000 INJECTION, SOLUTION INTRAVENOUS
Refills: 0 | Status: DISCONTINUED | OUTPATIENT
Start: 2024-02-20 | End: 2024-02-21

## 2024-02-20 RX ADMIN — Medication 10 MILLIGRAM(S): at 13:48

## 2024-02-20 RX ADMIN — Medication 12.5 MILLIGRAM(S): at 14:39

## 2024-02-20 RX ADMIN — HEPARIN SODIUM 5000 UNIT(S): 5000 INJECTION INTRAVENOUS; SUBCUTANEOUS at 21:34

## 2024-02-20 RX ADMIN — ATORVASTATIN CALCIUM 10 MILLIGRAM(S): 80 TABLET, FILM COATED ORAL at 21:34

## 2024-02-20 RX ADMIN — SODIUM CHLORIDE 100 MILLILITER(S): 9 INJECTION, SOLUTION INTRAVENOUS at 15:09

## 2024-02-20 NOTE — PROGRESS NOTE ADULT - SUBJECTIVE AND OBJECTIVE BOX
The patient was seen and examined at bedside.  Patient vomited twice in the PACU initially after waking up but feels better now.  Denies complaints of chest pain, shortness of breath, nausea, acute pain.    T(C): 36.4 (02-20-24 @ 09:54), Max: 36.4 (02-20-24 @ 06:36)  HR: 75 (02-20-24 @ 16:30) (64 - 86)  BP: 156/71 (02-20-24 @ 16:30) (128/72 - 162/75)  RR: 16 (02-20-24 @ 16:30) (16 - 20)  SpO2: 100% (02-20-24 @ 16:30) (96% - 100%)  Wt(kg): --    Physical Exam:    General: NAD, A+Ox3  Abdomen: soft, non-tender, non-distended      02-20 @ 07:01  -  02-20 @ 16:42  --------------------------------------------------------  IN: 300 mL / OUT: 0 mL / NET: 300 mL      F - clear on CBI                          12.4   10.42 )-----------( 216      ( 20 Feb 2024 14:14 )             38.4       139  |  109<H>  |  20  ----------------------------<  166<H>  4.4   |  18<L>  |  1.21    Ca    7.4<L>      20 Feb 2024 14:14  Phos  3.2     02-20  Mg     2.1     02-20      CXR - pending read

## 2024-02-20 NOTE — PRE-OP CHECKLIST - SITE MARKED BY ANESTHESIOLOGIST
Shift [id number]     Neuro: A&Ox4.   Cardiac: Afebrile, VSS - hypertensive.         Respiratory: RA    GI/: Voiding spontaneously.   Diet/appetite: Regular diet tolerating well, NPO @ 0000?   Activity: independent with activity  Pain: denies  Skin: WDL    Lines: PIV SL.     Pt is accepting of procedure and stay overnight.     n/a

## 2024-02-20 NOTE — PRE-ANESTHESIA EVALUATION ADULT - NSANTHADDINFOFT_GEN_ALL_CORE
Denies any history of cardio-pulmonary or neurologic issues (heart attack, stents in the heart, irregular heart beat, procedures on the heart, asthma, COPD, stroke, seizure). Reports recent gait unsteadiness requiring cane, onset predates jiménez catheter. Able to walk multiple blocks w/ cane w/out stopping.

## 2024-02-20 NOTE — PATIENT PROFILE ADULT - FALL HARM RISK - HARM RISK INTERVENTIONS
Assistance with ambulation/Assistance OOB with selected safe patient handling equipment/Communicate Risk of Fall with Harm to all staff/Discuss with provider need for PT consult/Monitor gait and stability/Provide patient with walking aids - walker, cane, crutches/Reinforce activity limits and safety measures with patient and family/Sit up slowly, dangle for a short time, stand at bedside before walking/Tailored Fall Risk Interventions/Use of alarms - bed, chair and/or voice tab/Visual Cue: Yellow wristband and red socks/Bed in lowest position, wheels locked, appropriate side rails in place/Call bell, personal items and telephone in reach/Instruct patient to call for assistance before getting out of bed or chair/Non-slip footwear when patient is out of bed/Shelly to call system/Physically safe environment - no spills, clutter or unnecessary equipment/Purposeful Proactive Rounding/Room/bathroom lighting operational, light cord in reach

## 2024-02-20 NOTE — PRE-OP CHECKLIST - NSBLOODTRANS_GEN_A_CORE_SIUH
DATE OF CONSULTATION:      HISTORY OF PRESENT ILLNESS:  The patient is a 77-year-old woman.  History of pancreatic cancer.  History of previous Whipple procedure done and previous abdominal surgeries.  The patient also has a history of diabetes mellitus and chronic anemia.  The patient presented with more of an upper abdominal and lower chest discomfort with a gas like sensation.  This lasted for a few hours she thinks yesterday.  The pain is more or less resolved  currently.  Initial EKG revealed sinus rhythm with nonspecific T-wave abnormality.  3 sets of troponin I's have been negative.  In September of 2019, she underwent left heart cardiac catheterization which revealed mild coronary artery disease.  More recently, she underwent a stress test, Lexiscan Myoview study in June of 2020, which was also unremarkable.  She does have some chronic shortness of breath, but denies any recent worsening.  Denies  any recent fever or chills.  She denies orthopnea or PND.  Denies palpitations, presyncope, or syncope.  She also underwent an echocardiographic study in June of 2020, which revealed normal LV function.  There were some wall motion abnormalities which had previously led to a left heart cardiac catheterization done in September of 2019, which at that time did not reveal any significant coronary artery disease.    HOME MEDICATIONS:  Have included pancrelipase, omega-3 fatty acids, fluoxetine, Neurontin, insulin, cholecalciferol, omeprazole, ursodiol.    PAST MEDICAL HISTORY:    1. Pancreatic cancer, status post Whipple procedure.  2. Mild coronary artery disease on left heart cardiac catheterization done in 2019 with negative stress test repeated in June of 2020.  3. Type 2 diabetes mellitus.  4. History of portal hypertension and gastroesophageal varices and gastroparesis.  5. Anemia of chronic disease.  6. Previous history of brain aneurysm with coil procedure performed.  7. History of pancreatic cancer and  Whipple procedure.    SOCIAL HISTORY:  She used to smoke for a long time but does not anymore.    FAMILY HISTORY:  Not contributory for premature coronary artery disease.    REVIEW OF SYSTEM:  As above.    PHYSICAL EXAMINATION:    VITAL SIGNS:  Blood pressure is 138/80, pulse is 73, respirations 18, afebrile.     HEENT:  Grossly unremarkable.     NECK:  Supple.  Carotid upstrokes are brisk.  There is no carotid bruit.     LUNGS:  Moderate air entry.  Clear anteriorly.     CARDIOVASCULAR:  [place] beat slightly displaced.  First heart sound normal.  Second heart sound normal.  No rubs or gallops audible.     ABDOMEN:  Soft.  There is no significant tenderness in epigastric area.     EXTREMITIES:  Warm.  There is no significant edema.    TESTING:  EKGs done revealed sinus rhythm with nonspecific T-wave abnormality.  3 sets of troponin I's have been negative.  BUN 7, creatinine 0.62, sodium 139, potassium 4.0.  White count 2.7, hemoglobin is 10.1, platelet count is 150,000.    IMPRESSION:    1. Atypical upper abdominal and epigastric pain.  It is lasting a few hours with negative EKG as well as troponin I's.  The patient has undergone left heart cardiac catheterization in September of 2019, which revealed mild coronary artery disease.  More recently, she underwent a stress test in June of 2020, which was also unremarkable.  2. History of previous pancreatic cancer and Whipple surgery.  3. Previous history of anemia of chronic disease.  4. History of gastroesophageal varices and portal hypertension.  5. History of previous coil embolization for brain aneurysm.    RECOMMENDATIONS:    1. From the cardiac standpoint, given her atypical pain and recent evaluation with negative cardiac workup including left heart cardiac catheterization done in September 2019, which revealed mild coronary artery disease, and more recently a Lexiscan Myoview study done in June of 2020, which was normal, no further cardiac workup at this point.   2. Lipid profile if not recently done.  3. We will continue Protonix which she has been taking at home.        ______________________________  Buzz Garcia MD  [id number]      D:  09/03/2020 09:53:46  T:  09/03/2020 16:54:51   EBONI/modl   Job:  [id number]   no...

## 2024-02-21 ENCOUNTER — TRANSCRIPTION ENCOUNTER (OUTPATIENT)
Age: 64
End: 2024-02-21

## 2024-02-21 VITALS
HEART RATE: 76 BPM | SYSTOLIC BLOOD PRESSURE: 136 MMHG | OXYGEN SATURATION: 99 % | DIASTOLIC BLOOD PRESSURE: 67 MMHG | TEMPERATURE: 98 F | RESPIRATION RATE: 18 BRPM

## 2024-02-21 LAB
ANION GAP SERPL CALC-SCNC: 12 MMOL/L — SIGNIFICANT CHANGE UP (ref 5–17)
BASOPHILS # BLD AUTO: 0.03 K/UL — SIGNIFICANT CHANGE UP (ref 0–0.2)
BASOPHILS NFR BLD AUTO: 0.3 % — SIGNIFICANT CHANGE UP (ref 0–2)
BUN SERPL-MCNC: 18 MG/DL — SIGNIFICANT CHANGE UP (ref 7–23)
CALCIUM SERPL-MCNC: 8.4 MG/DL — SIGNIFICANT CHANGE UP (ref 8.4–10.5)
CHLORIDE SERPL-SCNC: 105 MMOL/L — SIGNIFICANT CHANGE UP (ref 96–108)
CO2 SERPL-SCNC: 22 MMOL/L — SIGNIFICANT CHANGE UP (ref 22–31)
CREAT SERPL-MCNC: 1.23 MG/DL — SIGNIFICANT CHANGE UP (ref 0.5–1.3)
EGFR: 66 ML/MIN/1.73M2 — SIGNIFICANT CHANGE UP
EOSINOPHIL # BLD AUTO: 0.01 K/UL — SIGNIFICANT CHANGE UP (ref 0–0.5)
EOSINOPHIL NFR BLD AUTO: 0.1 % — SIGNIFICANT CHANGE UP (ref 0–6)
GLUCOSE SERPL-MCNC: 130 MG/DL — HIGH (ref 70–99)
HCT VFR BLD CALC: 39.5 % — SIGNIFICANT CHANGE UP (ref 39–50)
HGB BLD-MCNC: 12.6 G/DL — LOW (ref 13–17)
IMM GRANULOCYTES NFR BLD AUTO: 0.5 % — SIGNIFICANT CHANGE UP (ref 0–0.9)
LYMPHOCYTES # BLD AUTO: 0.78 K/UL — LOW (ref 1–3.3)
LYMPHOCYTES # BLD AUTO: 8.2 % — LOW (ref 13–44)
MCHC RBC-ENTMCNC: 28.9 PG — SIGNIFICANT CHANGE UP (ref 27–34)
MCHC RBC-ENTMCNC: 31.9 GM/DL — LOW (ref 32–36)
MCV RBC AUTO: 90.6 FL — SIGNIFICANT CHANGE UP (ref 80–100)
MONOCYTES # BLD AUTO: 0.7 K/UL — SIGNIFICANT CHANGE UP (ref 0–0.9)
MONOCYTES NFR BLD AUTO: 7.4 % — SIGNIFICANT CHANGE UP (ref 2–14)
NEUTROPHILS # BLD AUTO: 7.95 K/UL — HIGH (ref 1.8–7.4)
NEUTROPHILS NFR BLD AUTO: 83.5 % — HIGH (ref 43–77)
NRBC # BLD: 0 /100 WBCS — SIGNIFICANT CHANGE UP (ref 0–0)
PLATELET # BLD AUTO: 241 K/UL — SIGNIFICANT CHANGE UP (ref 150–400)
POTASSIUM SERPL-MCNC: 4.4 MMOL/L — SIGNIFICANT CHANGE UP (ref 3.5–5.3)
POTASSIUM SERPL-SCNC: 4.4 MMOL/L — SIGNIFICANT CHANGE UP (ref 3.5–5.3)
RBC # BLD: 4.36 M/UL — SIGNIFICANT CHANGE UP (ref 4.2–5.8)
RBC # FLD: 13 % — SIGNIFICANT CHANGE UP (ref 10.3–14.5)
SODIUM SERPL-SCNC: 139 MMOL/L — SIGNIFICANT CHANGE UP (ref 135–145)
WBC # BLD: 9.52 K/UL — SIGNIFICANT CHANGE UP (ref 3.8–10.5)
WBC # FLD AUTO: 9.52 K/UL — SIGNIFICANT CHANGE UP (ref 3.8–10.5)

## 2024-02-21 PROCEDURE — 88300 SURGICAL PATH GROSS: CPT

## 2024-02-21 PROCEDURE — 88305 TISSUE EXAM BY PATHOLOGIST: CPT

## 2024-02-21 PROCEDURE — 36415 COLL VENOUS BLD VENIPUNCTURE: CPT

## 2024-02-21 PROCEDURE — C1889: CPT

## 2024-02-21 PROCEDURE — 84100 ASSAY OF PHOSPHORUS: CPT

## 2024-02-21 PROCEDURE — 82365 CALCULUS SPECTROSCOPY: CPT

## 2024-02-21 PROCEDURE — C9399: CPT

## 2024-02-21 PROCEDURE — 71045 X-RAY EXAM CHEST 1 VIEW: CPT

## 2024-02-21 PROCEDURE — 85025 COMPLETE CBC W/AUTO DIFF WBC: CPT

## 2024-02-21 PROCEDURE — C1782: CPT

## 2024-02-21 PROCEDURE — 80048 BASIC METABOLIC PNL TOTAL CA: CPT

## 2024-02-21 PROCEDURE — 83735 ASSAY OF MAGNESIUM: CPT

## 2024-02-21 RX ORDER — FLUCONAZOLE 150 MG/1
1 TABLET ORAL
Refills: 0 | DISCHARGE

## 2024-02-21 RX ADMIN — SODIUM CHLORIDE 100 MILLILITER(S): 9 INJECTION, SOLUTION INTRAVENOUS at 00:54

## 2024-02-21 RX ADMIN — HEPARIN SODIUM 5000 UNIT(S): 5000 INJECTION INTRAVENOUS; SUBCUTANEOUS at 05:59

## 2024-02-21 RX ADMIN — Medication 10 MILLIGRAM(S): at 05:58

## 2024-02-21 RX ADMIN — Medication 500 MILLIGRAM(S): at 05:59

## 2024-02-21 RX ADMIN — FINASTERIDE 5 MILLIGRAM(S): 5 TABLET, FILM COATED ORAL at 12:22

## 2024-02-21 NOTE — ADVANCED PRACTICE NURSE CONSULT - RECOMMEDATIONS
Impression  urinary incontinence  bilateral sacrum deep tissue injury, present on admission   bilateral heels skin intact        Recommendations   1. Bilateral  , sacral / buttocks  deep tissue injury  Topical therapy- sacral/bilateral buttocks- cleanse w/incontinent cleanser, pat dry & apply luzmaria cream  twice daily & prn soiling .  monitor    for changes .  2. Right and left heel Elevate heels; apply Complete [place] air fluidized boots; ensure that the soles of the feet are not resting on the foot board of the bed.  3  Incontinent management - incontinent cleanser, pads,  tessie care  BID  4. Maintain on an alternating air with low air loss surface   5. Turn & reposition every 2 hr; Use positioning pillow to turn and reposition, soft pillow between bony prominences; continue measures to decrease friction/shear/pressure.  6. Nutrition optimization.  7. Place  waffle cushion when out of bed to chair .   plan of care reviewed with covering staff LUIS Sal

## 2024-02-21 NOTE — DISCHARGE NOTE PROVIDER - ATTENDING ATTESTATION STATEMENT
I have personally seen and examined the patient. I have collaborated with and supervised the
Wheelchair/Stroller

## 2024-02-21 NOTE — ADVANCED PRACTICE NURSE CONSULT - REASON FOR CONSULT
Consult to assess patient skin initiated by RN bilateral heels/ sacrum deep tissue injury, present on admission.      Reason for Admission:  · Reason for Admission	laser enucleation of the prostate  Reason for Admission	s/p thuLEP  Hospital Course	  62yo male admitted 2/20 s/p scheduled thuLEP. post op on CBI.   CBI clamped POD#1. urine remained clear. jiménez removed and patient passed tov.   AVSS, hemodynamically stable. Plan to complete course of antibiotics that he has at home.

## 2024-02-21 NOTE — DISCHARGE NOTE PROVIDER - NSDCCPCAREPLAN_GEN_ALL_CORE_FT
PRINCIPAL DISCHARGE DIAGNOSIS  Diagnosis: BPH (benign prostatic hyperplasia)  Assessment and Plan of Treatment: Call the office if you have fever greater than 101, difficulty urinating, pain not relieved with pain medication, nausea/vomiting. Drink plenty of fluids.  No heavy lifting or straining for 4 to 6 weeks. Avoid becoming constipated. You may have intermittent pink tinged urine and urinary dribbling.  This is normal.   If your urine becomes bright red or with clots, please call the office.   complete entire course of antibiotics as prescribed.

## 2024-02-21 NOTE — PROGRESS NOTE ADULT - SUBJECTIVE AND OBJECTIVE BOX
UROLOGY DAILY PROGRESS NOTE:     Subjective: Patient seen and examined at bedside. No overnight events. Patient concerned with jiménez removal as he has to bear down to sit up. Denies overnight pain.       Objective:  Vital signs  T(F): , Max: 98.6 (02-20-24 @ 22:00)  HR: 84 (02-21-24 @ 05:56)  BP: 154/70 (02-21-24 @ 05:56)  SpO2: 96% (02-21-24 @ 08:25)  Wt(kg): --    I&O's Summary    20 Feb 2024 07:01  -  21 Feb 2024 07:00  --------------------------------------------------------  IN: 2090 mL / OUT: 0 mL / NET: 2090 mL        Gen: NAD  Pulm: No respiratory distress, no subcostal retractions  CV: no JVD  Abd: Soft, NT, ND  : jiménez draining clear and yellow urine.     Labs:  02-21  9.52  / 39.5  /1.23   02-20  10.42 / 38.4  /1.21                           12.6   9.52  )-----------( 241      ( 21 Feb 2024 06:14 )             39.5     02-21    139  |  105  |  18  ----------------------------<  130<H>  4.4   |  22  |  1.23    Ca    8.4      21 Feb 2024 06:14  Phos  3.2     02-20  Mg     2.1     02-20

## 2024-02-21 NOTE — DISCHARGE NOTE PROVIDER - NSDCFUSCHEDAPPT_GEN_ALL_CORE_FT
Pema Navarro  [facility]  UROLOGY [address] [facility]  Scheduled Appointment: 03/06/2024    HAYDE Wood  [facility]  NEUROLOGY [address] [facility]  Scheduled Appointment: 04/29/2024

## 2024-02-21 NOTE — DISCHARGE NOTE PROVIDER - NSDCMRMEDTOKEN_GEN_ALL_CORE_FT
atorvastatin 10 mg oral tablet: 1 tab(s) orally once a day  Bactrim  mg-160 mg oral tablet: 1 tab(s) orally 2 times a day  Cipro 500 mg oral tablet: 1 tab(s) orally 2 times a day  finasteride 5 mg oral tablet: 1 tab(s) orally once a day  tamsulosin 0.4 mg oral capsule: 1 cap(s) orally once a day (at bedtime)   atorvastatin 10 mg oral tablet: 1 tab(s) orally once a day  Cipro 500 mg oral tablet: 1 tab(s) orally 2 times a day  finasteride 5 mg oral tablet: 1 tab(s) orally once a day  tamsulosin 0.4 mg oral capsule: 1 cap(s) orally once a day (at bedtime)

## 2024-02-21 NOTE — PROVIDER CONTACT NOTE (OTHER) - ASSESSMENT
A&Ox4. No complaints of pain or discomfort. Patient resting comfortably in bed. Patient refusing IV Tylenol.

## 2024-02-21 NOTE — DISCHARGE NOTE PROVIDER - CARE PROVIDER_API CALL
Pema Navarro  Urology  [address] [place] [id number]  Phone: [phone]  Fax: [phone]  Follow Up Time:

## 2024-02-21 NOTE — DISCHARGE NOTE PROVIDER - HOSPITAL COURSE
64yo male admitted 2/20 s/p scheduled thuLEP. post op on CBI.   CBI clamped POD#1. urine remained clear. jiménez removed and patient passed tov.   AVSS, hemodynamically stable. Plan to complete course of antibiotics that he has at home.

## 2024-02-21 NOTE — ADVANCED PRACTICE NURSE CONSULT - ASSESSMENT
Arrived to unit patient sitting in a high back chair. assisted x 1 to stand. patient at this time experiencing  incontinence of urine.   patient bilateral heels blanchable erythremia patient "stated feel heels always red all his life that he does spend a lot time sitting in a chair at home".  bilateral sacrum there is  non- blanchable deep red  discoloration and hyperpigmentation  noted to measure approximately 5cm x 5cm x 0cm.  This  is consistent with deep tissue injury that is  present on admission.  patient  was educated  on the importance  for turning  and positioning  every 2 hours. The use of waffle cushion when out of bed  to chair  and to shift his Weight every 2 hours while in chair. The importance a keeping skin clean and dry and  to offload heels/feet , and optimal nutrition.  Patient verbalized understanding by teach back.

## 2024-02-21 NOTE — DISCHARGE NOTE PROVIDER - NSDCFUADDINST_GEN_ALL_CORE_FT
CATHETER: Some patients are sent home with a jiménez catheter, while others go home urinating on their own. If you still have a catheter, the nurses will review instructions and care before you go home. For men, you may have a prescription for lidocaine jelly to apply to the tip of your penis, as needed, for catheter related discomfort.  GENERAL: It is common to have blood in your urine after your procedure. It may be pink or even red; inform your doctor if you have a significant amount of clot in the urine or if you are unable to void at all or if your catheter stops draining. The urine may clear and then become bloody again especially as you are more physically active. It is not uncommon to have some burning when you urinate, this will gradually improve. With a catheter in place, it is not uncommon to have occasional leakage or urine or blood around the catheter. Please call your urologist if this is excessive and/or the urine is not draining through the catheter into the bag.  BATHING: You may shower or bathe. If going home with jmiénez, shower only until catheter is removed.  DIET: You may resume your regular diet and regular medication regimen.  PAIN: You may take Tylenol (acetaminophen) 650-975mg and/or Motrin (ibuprofen) 400-600mg, both available over the counter, for pain every 6 hours as needed. Do not exceed 4000mg of Tylenol (acetaminophen) daily. You may alternate these medications such that you take one or the other every 3 hours for around the clock pain coverage.  ANTIBIOTICS: You may be given a prescription for an antibiotic, please take this medication as instructed and be sure to complete the entire course.  STOOL SOFTENERS: Do not allow yourself to become constipated as straining may cause bleeding. Take stool softeners or a laxative (ex. Miralax, Colace, Senokot, ExLax, etc), available over the counter, if needed.  ACTIVITY: No heavy lifting or strenuous exercise until you are evaluated at your post-operative appointment. Otherwise, you may return to your usual level of physical activity.  ANTICOAGULATION: If you are taking any blood thinning medications, please discuss with your urologist prior to restarting these medications unless otherwise specified.  FOLLOW-UP: If you did not already schedule your post-operative appointment, please call your urologist to schedule and follow-up appointment.  CALL YOUR UROLOGIST IF: You have any bleeding that does not stop, inability to void >8 hours, fever over 100.4 F, chills, persistent nausea/vomiting, changes in your incision concerning for infection, or if your pain is not controlled on your discharge pain medications.

## 2024-02-21 NOTE — PROGRESS NOTE ADULT - ASSESSMENT
63 year old male s/p laser enucleation of the prostate    -continue CBI, clamp in AM  -analgesia and antiemetics needed  -OOB/DVT prophylaxis/incentive spirometry  -AM labs, lasix, TOV
63 year old male s/p laser enucleation of the prostate, POD #1      -F/U AM Labs  - C/W abx- cipro  -Beckwith removed today. Undergoing TOV  -PVR   -analgesia and antiemetics needed  -OOB/DVT prophylaxis/incentive spirometry  - D/C planning

## 2024-02-21 NOTE — CONSULT NOTE ADULT - SUBJECTIVE AND OBJECTIVE BOX
CHIEF COMPLAINT:Patient is a 63y old  Male who presents with a chief complaint of     HISTORY OF PRESENT ILLNESS:    63 male with history as below is now s/p   Cystoscopy , laser enucleation of prostate with morcellation  denies any chest pain, sob, palpitation, dizziness or syncope.    PAST MEDICAL & SURGICAL HISTORY:  Hyperlipidemia      BPH with urinary obstruction      History of urinary retention      Beckwith catheter in place      No significant past surgical history              MEDICATIONS:  heparin   Injectable 5000 Unit(s) SubCutaneous every 8 hours    ciprofloxacin     Tablet 500 milliGRAM(s) Oral every 12 hours      acetaminophen   IVPB .. 1000 milliGRAM(s) IV Intermittent every 6 hours  oxyCODONE    IR 5 milliGRAM(s) Oral every 4 hours PRN      atorvastatin 10 milliGRAM(s) Oral at bedtime  finasteride 5 milliGRAM(s) Oral daily    influenza   Vaccine 0.5 milliLiter(s) IntraMuscular once  lactated ringers. 1000 milliLiter(s) IV Continuous <Continuous>      FAMILY HISTORY:      Non-contributory    SOCIAL HISTORY:    No tobacco, drugs or etoh    Allergies    No Known Allergies    Intolerances    	    REVIEW OF SYSTEMS:  as above  The rest of the 14 points ROS reviewed and except above they are unremarkable.        PHYSICAL EXAM:  T(C): 36.8 (02-21-24 @ 08:25), Max: 37 (02-20-24 @ 22:00)  HR: 84 (02-21-24 @ 05:56) (64 - 86)  BP: 154/70 (02-21-24 @ 05:56) (128/72 - 163/87)  RR: 18 (02-21-24 @ 08:25) (16 - 20)  SpO2: 96% (02-21-24 @ 08:25) (96% - 100%)  Wt(kg): --  I&O's Summary    20 Feb 2024 07:01  -  21 Feb 2024 07:00  --------------------------------------------------------  IN: 2090 mL / OUT: 0 mL / NET: 2090 mL        Appearance: Normal	  HEENT:   no gross abnormality   Cardiovascular: Normal S1 S2,    Murmur:   Neck: JVP normal  Respiratory: Lungs clear   Gastrointestinal:  Soft, Non-tender  Skin: normal   Neuro: No gross deficits.   Psychiatry:  Mood & affect flat  Ext: No edema    LABS/DATA:    TELEMETRY: 	    ECG:  	   	  CARDIAC MARKERS:                                      12.6   9.52  )-----------( 241      ( 21 Feb 2024 06:14 )             39.5     02-21    139  |  105  |  18  ----------------------------<  130<H>  4.4   |  22  |  1.23    Ca    8.4      21 Feb 2024 06:14  Phos  3.2     02-20  Mg     2.1     02-20      proBNP:   Lipid Profile:   HgA1c:   TSH:

## 2024-02-21 NOTE — DISCHARGE NOTE NURSING/CASE MANAGEMENT/SOCIAL WORK - PATIENT PORTAL LINK FT
You can access the FollowMyHealth Patient Portal offered by [facility] by registering at the following website: [email or web address]. By joining [employer]’s FollowMyHealth portal, you will also be able to view your health information using other applications (apps) compatible with our system.

## 2024-02-22 ENCOUNTER — EMERGENCY (EMERGENCY)
Facility: HOSPITAL | Age: 64
LOS: 1 days | Discharge: HOME | End: 2024-02-22
Attending: STUDENT IN AN ORGANIZED HEALTH CARE EDUCATION/TRAINING PROGRAM
Payer: COMMERCIAL

## 2024-02-22 VITALS
OXYGEN SATURATION: 99 % | RESPIRATION RATE: 18 BRPM | TEMPERATURE: 98 F | DIASTOLIC BLOOD PRESSURE: 82 MMHG | HEIGHT: 76 IN | HEART RATE: 89 BPM | SYSTOLIC BLOOD PRESSURE: 149 MMHG | WEIGHT: 233.03 LBS

## 2024-02-22 VITALS
DIASTOLIC BLOOD PRESSURE: 77 MMHG | HEART RATE: 88 BPM | TEMPERATURE: 98 F | SYSTOLIC BLOOD PRESSURE: 140 MMHG | RESPIRATION RATE: 20 BRPM | OXYGEN SATURATION: 99 %

## 2024-02-22 LAB
ALBUMIN SERPL ELPH-MCNC: 3.9 G/DL — SIGNIFICANT CHANGE UP (ref 3.3–5)
ALP SERPL-CCNC: 49 U/L — SIGNIFICANT CHANGE UP (ref 40–120)
ALT FLD-CCNC: 34 U/L — SIGNIFICANT CHANGE UP (ref 10–45)
ANION GAP SERPL CALC-SCNC: 14 MMOL/L — SIGNIFICANT CHANGE UP (ref 5–17)
APPEARANCE UR: CLEAR — SIGNIFICANT CHANGE UP
APTT BLD: 24.5 SEC — SIGNIFICANT CHANGE UP (ref 24.5–35.6)
AST SERPL-CCNC: 27 U/L — SIGNIFICANT CHANGE UP (ref 10–40)
BACTERIA # UR AUTO: NEGATIVE /HPF — SIGNIFICANT CHANGE UP
BASOPHILS # BLD AUTO: 0.05 K/UL — SIGNIFICANT CHANGE UP (ref 0–0.2)
BASOPHILS NFR BLD AUTO: 0.5 % — SIGNIFICANT CHANGE UP (ref 0–2)
BILIRUB SERPL-MCNC: 0.6 MG/DL — SIGNIFICANT CHANGE UP (ref 0.2–1.2)
BILIRUB UR-MCNC: NEGATIVE — SIGNIFICANT CHANGE UP
BUN SERPL-MCNC: 32 MG/DL — HIGH (ref 7–23)
CALCIUM SERPL-MCNC: 8.5 MG/DL — SIGNIFICANT CHANGE UP (ref 8.4–10.5)
CAST: 2 /LPF — SIGNIFICANT CHANGE UP (ref 0–4)
CHLORIDE SERPL-SCNC: 105 MMOL/L — SIGNIFICANT CHANGE UP (ref 96–108)
CO2 SERPL-SCNC: 19 MMOL/L — LOW (ref 22–31)
COLOR SPEC: YELLOW — SIGNIFICANT CHANGE UP
CREAT SERPL-MCNC: 1.46 MG/DL — HIGH (ref 0.5–1.3)
DIFF PNL FLD: ABNORMAL
EGFR: 54 ML/MIN/1.73M2 — LOW
EOSINOPHIL # BLD AUTO: 0.1 K/UL — SIGNIFICANT CHANGE UP (ref 0–0.5)
EOSINOPHIL NFR BLD AUTO: 1.1 % — SIGNIFICANT CHANGE UP (ref 0–6)
GLUCOSE SERPL-MCNC: 112 MG/DL — HIGH (ref 70–99)
GLUCOSE UR QL: NEGATIVE MG/DL — SIGNIFICANT CHANGE UP
HCT VFR BLD CALC: 41.4 % — SIGNIFICANT CHANGE UP (ref 39–50)
HGB BLD-MCNC: 13.4 G/DL — SIGNIFICANT CHANGE UP (ref 13–17)
IMM GRANULOCYTES NFR BLD AUTO: 0.4 % — SIGNIFICANT CHANGE UP (ref 0–0.9)
INR BLD: 1.03 RATIO — SIGNIFICANT CHANGE UP (ref 0.85–1.18)
KETONES UR-MCNC: NEGATIVE MG/DL — SIGNIFICANT CHANGE UP
LEUKOCYTE ESTERASE UR-ACNC: ABNORMAL
LIDOCAIN IGE QN: 73 U/L — HIGH (ref 7–60)
LYMPHOCYTES # BLD AUTO: 1.42 K/UL — SIGNIFICANT CHANGE UP (ref 1–3.3)
LYMPHOCYTES # BLD AUTO: 15 % — SIGNIFICANT CHANGE UP (ref 13–44)
MAGNESIUM SERPL-MCNC: 1.8 MG/DL — SIGNIFICANT CHANGE UP (ref 1.6–2.6)
MCHC RBC-ENTMCNC: 28.9 PG — SIGNIFICANT CHANGE UP (ref 27–34)
MCHC RBC-ENTMCNC: 32.4 GM/DL — SIGNIFICANT CHANGE UP (ref 32–36)
MCV RBC AUTO: 89.2 FL — SIGNIFICANT CHANGE UP (ref 80–100)
MONOCYTES # BLD AUTO: 0.62 K/UL — SIGNIFICANT CHANGE UP (ref 0–0.9)
MONOCYTES NFR BLD AUTO: 6.6 % — SIGNIFICANT CHANGE UP (ref 2–14)
NEUTROPHILS # BLD AUTO: 7.22 K/UL — SIGNIFICANT CHANGE UP (ref 1.8–7.4)
NEUTROPHILS NFR BLD AUTO: 76.4 % — SIGNIFICANT CHANGE UP (ref 43–77)
NITRITE UR-MCNC: NEGATIVE — SIGNIFICANT CHANGE UP
NRBC # BLD: 0 /100 WBCS — SIGNIFICANT CHANGE UP (ref 0–0)
PH UR: 6.5 — SIGNIFICANT CHANGE UP (ref 5–8)
PLATELET # BLD AUTO: 246 K/UL — SIGNIFICANT CHANGE UP (ref 150–400)
POTASSIUM SERPL-MCNC: 4.3 MMOL/L — SIGNIFICANT CHANGE UP (ref 3.5–5.3)
POTASSIUM SERPL-SCNC: 4.3 MMOL/L — SIGNIFICANT CHANGE UP (ref 3.5–5.3)
PROT SERPL-MCNC: 6.8 G/DL — SIGNIFICANT CHANGE UP (ref 6–8.3)
PROT UR-MCNC: 100 MG/DL
PROTHROM AB SERPL-ACNC: 11.3 SEC — SIGNIFICANT CHANGE UP (ref 9.5–13)
RBC # BLD: 4.64 M/UL — SIGNIFICANT CHANGE UP (ref 4.2–5.8)
RBC # FLD: 13.2 % — SIGNIFICANT CHANGE UP (ref 10.3–14.5)
RBC CASTS # UR COMP ASSIST: 205 /HPF — HIGH (ref 0–4)
SODIUM SERPL-SCNC: 138 MMOL/L — SIGNIFICANT CHANGE UP (ref 135–145)
SP GR SPEC: 1.01 — SIGNIFICANT CHANGE UP (ref 1–1.03)
SQUAMOUS # UR AUTO: 1 /HPF — SIGNIFICANT CHANGE UP (ref 0–5)
UROBILINOGEN FLD QL: 1 MG/DL — SIGNIFICANT CHANGE UP (ref 0.2–1)
WBC # BLD: 9.45 K/UL — SIGNIFICANT CHANGE UP (ref 3.8–10.5)
WBC # FLD AUTO: 9.45 K/UL — SIGNIFICANT CHANGE UP (ref 3.8–10.5)
WBC UR QL: 22 /HPF — HIGH (ref 0–5)

## 2024-02-22 PROCEDURE — 99284 EMERGENCY DEPT VISIT MOD MDM: CPT

## 2024-02-22 PROCEDURE — 85610 PROTHROMBIN TIME: CPT

## 2024-02-22 PROCEDURE — 81001 URINALYSIS AUTO W/SCOPE: CPT

## 2024-02-22 PROCEDURE — 85730 THROMBOPLASTIN TIME PARTIAL: CPT

## 2024-02-22 PROCEDURE — 80053 COMPREHEN METABOLIC PANEL: CPT

## 2024-02-22 PROCEDURE — 51702 INSERT TEMP BLADDER CATH: CPT

## 2024-02-22 PROCEDURE — 83735 ASSAY OF MAGNESIUM: CPT

## 2024-02-22 PROCEDURE — 83690 ASSAY OF LIPASE: CPT

## 2024-02-22 PROCEDURE — 85025 COMPLETE CBC W/AUTO DIFF WBC: CPT

## 2024-02-22 PROCEDURE — 87086 URINE CULTURE/COLONY COUNT: CPT

## 2024-02-22 PROCEDURE — 99284 EMERGENCY DEPT VISIT MOD MDM: CPT | Mod: 25

## 2024-02-22 RX ORDER — SODIUM CHLORIDE 9 MG/ML
500 INJECTION INTRAMUSCULAR; INTRAVENOUS; SUBCUTANEOUS ONCE
Refills: 0 | Status: COMPLETED | OUTPATIENT
Start: 2024-02-22 | End: 2024-02-22

## 2024-02-22 RX ADMIN — SODIUM CHLORIDE 500 MILLILITER(S): 9 INJECTION INTRAMUSCULAR; INTRAVENOUS; SUBCUTANEOUS at 06:09

## 2024-02-22 NOTE — ED ADULT NURSE NOTE - NSFALLRISKINTERV_ED_ALL_ED

## 2024-02-22 NOTE — ED ADULT NURSE NOTE - OBJECTIVE STATEMENT
64 y/o M PMH HLD, BPH presented to ED via walk in triage c/o urinary retention, pt states he had prostate resection on Tuesday, jiménez cath from procedure was removed yesterday in outpatient office, pt has not urinated since 12pm yesterday (approx 12 hours), pt endorsing severe abdominal discomfort and distention, states it feels like a pressure causing shortness of breath, some relief found when laying flat. Pt denies any fevers, chills, bleeding s/p surgery. A&Ox4, breathing spontaneously and unlabored on room air, moving all extremities easily, is ambulatory with cane at baseline.    Bladder scan performed at bedside, 1200mL present on scan. EM and urology MDs aware of bladder scan, urology MD present at bedside to place jiménez catheter.

## 2024-02-22 NOTE — ED PROVIDER NOTE - PROGRESS NOTE DETAILS
Urology evaluated the patient, due to lack of blood in drain urine and improvement on symptoms,  recommended discharge with follow-up outpatient.  Spoke to patient/family about results including incidental findings. Plan to discharge patient. Patient given  Urology and PCP follow up and return precautions. Patient/family agrees with plan.

## 2024-02-22 NOTE — ED PROVIDER NOTE - CARE PROVIDER_API CALL
Pema Navarro  Urology  [address] [place] [address]  Phone: [phone]  Fax: [phone]  Follow Up Time: 1-3 Days

## 2024-02-22 NOTE — ED PROVIDER NOTE - ATTENDING CONTRIBUTION TO CARE
I have personally performed a face to face medical and diagnostic evaluation of the patient. I have discussed with and reviewed the Resident's note and agree with the History, ROS, Physical Exam and MDM unless otherwise indicated. A brief summary of my personal evaluation and impression can be found below.    63-year-old male, past medical history of BPH with recent procedural intervention with urology presenting with chief complaint of urinary retention since being discharged from the procedure.  Has not been able to urinate for about 1 day.  Denies any nausea, vomiting, fevers, chills, flank pain.  Overall well-appearing.  Given recent instrumentation, urology consulted for Beckwith catheter placement.  Placed successfully.  Will observe patient in the ER for postobstructive diuresis, will assess labs including renal function.  Reassess to dispo.  If no actionable findings, patient can go home with Beckwith catheter and follow-up with urology outpatient. Leobardo Salazar, ED Attending

## 2024-02-22 NOTE — ED PROVIDER NOTE - NSFOLLOWUPINSTRUCTIONS_ED_ALL_ED_FT
You were seen in the Emergency Department for Urinary retention.     1) Advance activity as tolerated.   2) Continue all previously prescribed medications as directed.    3) Follow up with Urology and your primary care physician in 24-72 hours - take copies of your results.    4) Return to the Emergency Department for worsening or persistent symptoms, and/or ANY NEW OR CONCERNING SYMPTOMS.     Urinary Retention    Urinary retention is the inability to completely empty your bladder. This is a common problem in older men, especially with enlarged prostates. If you are sent home with a jiménez catheter and a drainage system make sure to keep the drainage bag emptied and lower than your catheter. Keep the jiménez catheter in until you follow up with a urologist.    SEEK IMMEDIATE MEDICAL CARE IF YOU DEVELOP THE FOLLOWING SYMPTOMS: the catheter stops draining urine, the catheter falls out, abdominal pain, nausea/vomiting, or chills/fever.

## 2024-02-22 NOTE — ED PROVIDER NOTE - CLINICAL SUMMARY MEDICAL DECISION MAKING FREE TEXT BOX
63-year-old male pmhx of BPH status post laser enucleation of prostate 2 days prior comes to ED w/ difficulty urinating. Started after patient was discharged from procedure 10 hours prior to presentation to the ED.  Reports inability to urinate due to persistence of pain decided to come into the emergency department. Their pain/symptom is moderate to severe, constant, non mediating with rest. Reports additional symptoms of nausea, denies trauma, falls, fever, headache, LOC, Head Trauma, AMS, dizziness, syncope, shortness of breath, cough, rhinorrhea, congestion, chest pain, palpitations, vomiting, diarrhea, constipation, melena, hematochezia, dysuria, hematuria, urinary frequency, flank pain, skin changes, p.o. issues, issues stooling, issues with ambulation, back pain..    General: non-toxic, NAD   HEENT: NCAT, PERRL   Cardiac: RRR, no murmurs, 2+ peripheral pulses   Chest: CTAB   Abdomen: Suprapubic distention and tenderness. Bowel sounds present, no rebound or guarding   Extremities: no peripheral edema, calf tenderness, or leg size discrepancies   Skin: no rashes   Neuro: AAOx4, 5+motor, sensory grossly intact   Psych: mood and affect appropriate    Bladder scan of 1200 mL.    Impression: 63-year-old male pmhx of BPH status post laser enucleation of prostate 2 days prior comes to ED w/ difficulty urinating. Their symptoms and exam findings with findings of 1.2 L bladder scan are concerning for urinary retention.  Plan to have urology evaluate, place Beckwith.     Ordered labs, medications for diagnosis, management, and treatment.

## 2024-02-22 NOTE — ED ADULT TRIAGE NOTE - CHIEF COMPLAINT QUOTE
urinary retention since 12pm, abdominal discomfort  had urinary catheter removed yesterday after prostate resection

## 2024-02-22 NOTE — ED PROVIDER NOTE - CHIEF COMPLAINT
The patient is a 63y Male complaining of urinary retention. Clindamycin Pregnancy And Lactation Text: This medication can be used in pregnancy if certain situations. Clindamycin is also present in breast milk.

## 2024-02-22 NOTE — PROCEDURE NOTE - ADDITIONAL PROCEDURE DETAILS
Called by ED for patient with urinary retention s/p thulep.    At bedside, pt stated his urine has been hematuric with punch colored urine and he was concerning about having to bear down to sit up.    Using aseptic technique, area prepped in traditional sterile fashion, lidocaine urojet instilled into urethra, and 20F 6eye catheter placed without resistance, anticipating hematuria and clots. 1300cc of clear yellow urine drained. 6eye was removed once bladder was emptied and a 16f coude was then placed. 20cc sterile water placed into balloon and jiménez catheter secured with stat lock. pt tolerated procedure well.    plan for outpatient TOV.

## 2024-02-22 NOTE — ED PROVIDER NOTE - PATIENT PORTAL LINK FT
You can access the FollowMyHealth Patient Portal offered by [facility] by registering at the following website: [email or web address]. By joining [employer]’s FollowMyHealth portal, you will also be able to view your health information using other applications (apps) compatible with our system.

## 2024-02-23 LAB
CULTURE RESULTS: SIGNIFICANT CHANGE UP
SPECIMEN SOURCE: SIGNIFICANT CHANGE UP

## 2024-02-27 ENCOUNTER — TRANSCRIPTION ENCOUNTER (OUTPATIENT)
Age: 64
End: 2024-02-27

## 2024-02-27 LAB — SURGICAL PATHOLOGY STUDY: SIGNIFICANT CHANGE UP

## 2024-02-28 ENCOUNTER — APPOINTMENT (OUTPATIENT)
Dept: UROLOGY | Facility: CLINIC | Age: 64
End: 2024-02-28
Payer: COMMERCIAL

## 2024-02-28 ENCOUNTER — OUTPATIENT (OUTPATIENT)
Dept: OUTPATIENT SERVICES | Facility: HOSPITAL | Age: 64
LOS: 1 days | End: 2024-02-28
Payer: COMMERCIAL

## 2024-02-28 VITALS
BODY MASS INDEX: 28.98 KG/M2 | SYSTOLIC BLOOD PRESSURE: 158 MMHG | HEIGHT: 76 IN | DIASTOLIC BLOOD PRESSURE: 74 MMHG | HEART RATE: 83 BPM | WEIGHT: 238 LBS | TEMPERATURE: 97.9 F | RESPIRATION RATE: 17 BRPM

## 2024-02-28 DIAGNOSIS — R35.0 FREQUENCY OF MICTURITION: ICD-10-CM

## 2024-02-28 DIAGNOSIS — R33.9 RETENTION OF URINE, UNSPECIFIED: ICD-10-CM

## 2024-02-28 LAB
CELL MATERIAL STONE EST-MCNT: SIGNIFICANT CHANGE UP
LABORATORY COMMENT REPORT: SIGNIFICANT CHANGE UP
NIDUS STONE QN: SIGNIFICANT CHANGE UP

## 2024-02-28 PROCEDURE — 99024 POSTOP FOLLOW-UP VISIT: CPT

## 2024-02-28 PROCEDURE — 51700 IRRIGATION OF BLADDER: CPT | Mod: 58

## 2024-02-28 PROCEDURE — 51700 IRRIGATION OF BLADDER: CPT

## 2024-03-03 NOTE — ASSESSMENT
[FreeTextEntry1] :  Plan Uroflow and PVR next visit PSA next visit f/u 3 months  Will discuss PCNL for stones in his solitary kidney at next visit

## 2024-03-04 DIAGNOSIS — N20.0 CALCULUS OF KIDNEY: ICD-10-CM

## 2024-03-04 DIAGNOSIS — N40.1 BENIGN PROSTATIC HYPERPLASIA WITH LOWER URINARY TRACT SYMPTOMS: ICD-10-CM

## 2024-03-04 DIAGNOSIS — R33.9 RETENTION OF URINE, UNSPECIFIED: ICD-10-CM

## 2024-04-01 DIAGNOSIS — N50.89 OTHER SPECIFIED DISORDERS OF THE MALE GENITAL ORGANS: ICD-10-CM

## 2024-04-04 ENCOUNTER — APPOINTMENT (OUTPATIENT)
Dept: ULTRASOUND IMAGING | Facility: CLINIC | Age: 64
End: 2024-04-04
Payer: COMMERCIAL

## 2024-04-04 ENCOUNTER — OUTPATIENT (OUTPATIENT)
Dept: OUTPATIENT SERVICES | Facility: HOSPITAL | Age: 64
LOS: 1 days | End: 2024-04-04
Payer: COMMERCIAL

## 2024-04-04 DIAGNOSIS — N50.89 OTHER SPECIFIED DISORDERS OF THE MALE GENITAL ORGANS: ICD-10-CM

## 2024-04-04 PROCEDURE — 76870 US EXAM SCROTUM: CPT | Mod: 26

## 2024-04-04 PROCEDURE — 76870 US EXAM SCROTUM: CPT

## 2024-04-09 ENCOUNTER — APPOINTMENT (OUTPATIENT)
Dept: VASCULAR SURGERY | Facility: CLINIC | Age: 64
End: 2024-04-09
Payer: COMMERCIAL

## 2024-04-09 VITALS
DIASTOLIC BLOOD PRESSURE: 77 MMHG | WEIGHT: 232 LBS | HEIGHT: 76 IN | SYSTOLIC BLOOD PRESSURE: 145 MMHG | BODY MASS INDEX: 28.25 KG/M2 | HEART RATE: 68 BPM

## 2024-04-09 DIAGNOSIS — I72.8 ANEURYSM OF OTHER SPECIFIED ARTERIES: ICD-10-CM

## 2024-04-09 PROCEDURE — 99203 OFFICE O/P NEW LOW 30 MIN: CPT

## 2024-04-09 NOTE — HISTORY OF PRESENT ILLNESS
[FreeTextEntry1] : 64-year-old male presented for evaluation of splenic artery aneurysm.  Has history of prostate surgery for benign hyperplasia also history of solitary kidney and kidney stone.  CT imaging revealed evidence of calcified 1 cm splenic artery aneurysm.  Denies any claudication.  Denies any back pain or abdominal pain.

## 2024-04-09 NOTE — ASSESSMENT
[FreeTextEntry1] : 64-year-old male with subcentimeter calcified splenic artery aneurysm  No need for vascular intervention at this point Follow-up in 1 year

## 2024-04-09 NOTE — PHYSICAL EXAM
[JVD] : no jugular venous distention  [Normal Breath Sounds] : Normal breath sounds [Normal Heart Sounds] : normal heart sounds [FreeTextEntry1] : Palpable bilateral femoral and radial pulses.  Abdomen soft nontender not distended

## 2024-04-24 ENCOUNTER — NON-APPOINTMENT (OUTPATIENT)
Age: 64
End: 2024-04-24

## 2024-04-29 ENCOUNTER — INPATIENT (INPATIENT)
Facility: HOSPITAL | Age: 64
LOS: 4 days | Discharge: HOME | DRG: 312 | End: 2024-05-04
Attending: INTERNAL MEDICINE | Admitting: INTERNAL MEDICINE
Payer: COMMERCIAL

## 2024-04-29 ENCOUNTER — APPOINTMENT (OUTPATIENT)
Dept: NEUROLOGY | Facility: CLINIC | Age: 64
End: 2024-04-29
Payer: COMMERCIAL

## 2024-04-29 VITALS
HEIGHT: 76 IN | SYSTOLIC BLOOD PRESSURE: 172 MMHG | DIASTOLIC BLOOD PRESSURE: 83 MMHG | BODY MASS INDEX: 28.25 KG/M2 | HEART RATE: 69 BPM | WEIGHT: 232 LBS

## 2024-04-29 VITALS
DIASTOLIC BLOOD PRESSURE: 78 MMHG | WEIGHT: 231.93 LBS | OXYGEN SATURATION: 98 % | TEMPERATURE: 100 F | SYSTOLIC BLOOD PRESSURE: 161 MMHG | HEIGHT: 76 IN | RESPIRATION RATE: 17 BRPM | HEART RATE: 75 BPM

## 2024-04-29 DIAGNOSIS — R31.9 HEMATURIA, UNSPECIFIED: ICD-10-CM

## 2024-04-29 DIAGNOSIS — N39.0 URINARY TRACT INFECTION, SITE NOT SPECIFIED: ICD-10-CM

## 2024-04-29 DIAGNOSIS — R55 SYNCOPE AND COLLAPSE: ICD-10-CM

## 2024-04-29 DIAGNOSIS — Z29.9 ENCOUNTER FOR PROPHYLACTIC MEASURES, UNSPECIFIED: ICD-10-CM

## 2024-04-29 DIAGNOSIS — G95.9 DISEASE OF SPINAL CORD, UNSPECIFIED: ICD-10-CM

## 2024-04-29 DIAGNOSIS — I10 ESSENTIAL (PRIMARY) HYPERTENSION: ICD-10-CM

## 2024-04-29 DIAGNOSIS — I48.91 UNSPECIFIED ATRIAL FIBRILLATION: ICD-10-CM

## 2024-04-29 DIAGNOSIS — R26.0 ATAXIC GAIT: ICD-10-CM

## 2024-04-29 DIAGNOSIS — G25.3 MYOCLONUS: ICD-10-CM

## 2024-04-29 DIAGNOSIS — R42 DIZZINESS AND GIDDINESS: ICD-10-CM

## 2024-04-29 DIAGNOSIS — R50.9 FEVER, UNSPECIFIED: ICD-10-CM

## 2024-04-29 DIAGNOSIS — M54.12 DISEASE OF SPINAL CORD, UNSPECIFIED: ICD-10-CM

## 2024-04-29 DIAGNOSIS — R60.0 LOCALIZED EDEMA: ICD-10-CM

## 2024-04-29 DIAGNOSIS — R55 DIZZINESS AND GIDDINESS: ICD-10-CM

## 2024-04-29 LAB
ALBUMIN SERPL ELPH-MCNC: 3.9 G/DL — SIGNIFICANT CHANGE UP (ref 3.3–5)
ALP SERPL-CCNC: 46 U/L — SIGNIFICANT CHANGE UP (ref 40–120)
ALT FLD-CCNC: 15 U/L — SIGNIFICANT CHANGE UP (ref 10–45)
ANION GAP SERPL CALC-SCNC: 9 MMOL/L — SIGNIFICANT CHANGE UP (ref 5–17)
APPEARANCE UR: ABNORMAL
AST SERPL-CCNC: 12 U/L — SIGNIFICANT CHANGE UP (ref 10–40)
BACTERIA # UR AUTO: NEGATIVE /HPF — SIGNIFICANT CHANGE UP
BASOPHILS # BLD AUTO: 0.03 K/UL — SIGNIFICANT CHANGE UP (ref 0–0.2)
BASOPHILS NFR BLD AUTO: 0.4 % — SIGNIFICANT CHANGE UP (ref 0–2)
BILIRUB SERPL-MCNC: 1.1 MG/DL — SIGNIFICANT CHANGE UP (ref 0.2–1.2)
BILIRUB UR-MCNC: NEGATIVE — SIGNIFICANT CHANGE UP
BUN SERPL-MCNC: 23 MG/DL — SIGNIFICANT CHANGE UP (ref 7–23)
CALCIUM SERPL-MCNC: 9.2 MG/DL — SIGNIFICANT CHANGE UP (ref 8.4–10.5)
CAST: 1 /LPF — SIGNIFICANT CHANGE UP (ref 0–4)
CHLORIDE SERPL-SCNC: 102 MMOL/L — SIGNIFICANT CHANGE UP (ref 96–108)
CO2 SERPL-SCNC: 27 MMOL/L — SIGNIFICANT CHANGE UP (ref 22–31)
COLOR SPEC: SIGNIFICANT CHANGE UP
CREAT SERPL-MCNC: 1.17 MG/DL — SIGNIFICANT CHANGE UP (ref 0.5–1.3)
DIFF PNL FLD: ABNORMAL
EGFR: 70 ML/MIN/1.73M2 — SIGNIFICANT CHANGE UP
EOSINOPHIL # BLD AUTO: 0.08 K/UL — SIGNIFICANT CHANGE UP (ref 0–0.5)
EOSINOPHIL NFR BLD AUTO: 1 % — SIGNIFICANT CHANGE UP (ref 0–6)
FLUAV AG NPH QL: SIGNIFICANT CHANGE UP
FLUBV AG NPH QL: SIGNIFICANT CHANGE UP
GLUCOSE SERPL-MCNC: 115 MG/DL — HIGH (ref 70–99)
GLUCOSE UR QL: NEGATIVE MG/DL — SIGNIFICANT CHANGE UP
HCT VFR BLD CALC: 41.5 % — SIGNIFICANT CHANGE UP (ref 39–50)
HGB BLD-MCNC: 13.2 G/DL — SIGNIFICANT CHANGE UP (ref 13–17)
IMM GRANULOCYTES NFR BLD AUTO: 0.5 % — SIGNIFICANT CHANGE UP (ref 0–0.9)
KETONES UR-MCNC: NEGATIVE MG/DL — SIGNIFICANT CHANGE UP
LEUKOCYTE ESTERASE UR-ACNC: ABNORMAL
LIDOCAIN IGE QN: 16 U/L — SIGNIFICANT CHANGE UP (ref 7–60)
LYMPHOCYTES # BLD AUTO: 0.75 K/UL — LOW (ref 1–3.3)
LYMPHOCYTES # BLD AUTO: 9 % — LOW (ref 13–44)
MAGNESIUM SERPL-MCNC: 2.1 MG/DL — SIGNIFICANT CHANGE UP (ref 1.6–2.6)
MCHC RBC-ENTMCNC: 28.8 PG — SIGNIFICANT CHANGE UP (ref 27–34)
MCHC RBC-ENTMCNC: 31.8 GM/DL — LOW (ref 32–36)
MCV RBC AUTO: 90.6 FL — SIGNIFICANT CHANGE UP (ref 80–100)
MONOCYTES # BLD AUTO: 0.86 K/UL — SIGNIFICANT CHANGE UP (ref 0–0.9)
MONOCYTES NFR BLD AUTO: 10.4 % — SIGNIFICANT CHANGE UP (ref 2–14)
NEUTROPHILS # BLD AUTO: 6.53 K/UL — SIGNIFICANT CHANGE UP (ref 1.8–7.4)
NEUTROPHILS NFR BLD AUTO: 78.7 % — HIGH (ref 43–77)
NITRITE UR-MCNC: POSITIVE
NRBC # BLD: 0 /100 WBCS — SIGNIFICANT CHANGE UP (ref 0–0)
NT-PROBNP SERPL-SCNC: 817 PG/ML — HIGH (ref 0–300)
PH UR: 5.5 — SIGNIFICANT CHANGE UP (ref 5–8)
PLATELET # BLD AUTO: 207 K/UL — SIGNIFICANT CHANGE UP (ref 150–400)
POTASSIUM SERPL-MCNC: 3.9 MMOL/L — SIGNIFICANT CHANGE UP (ref 3.5–5.3)
POTASSIUM SERPL-SCNC: 3.9 MMOL/L — SIGNIFICANT CHANGE UP (ref 3.5–5.3)
PROT SERPL-MCNC: 7 G/DL — SIGNIFICANT CHANGE UP (ref 6–8.3)
PROT UR-MCNC: 30 MG/DL
RBC # BLD: 4.58 M/UL — SIGNIFICANT CHANGE UP (ref 4.2–5.8)
RBC # FLD: 14.1 % — SIGNIFICANT CHANGE UP (ref 10.3–14.5)
RBC CASTS # UR COMP ASSIST: 318 /HPF — HIGH (ref 0–4)
REVIEW: SIGNIFICANT CHANGE UP
RSV RNA NPH QL NAA+NON-PROBE: SIGNIFICANT CHANGE UP
SARS-COV-2 RNA SPEC QL NAA+PROBE: SIGNIFICANT CHANGE UP
SODIUM SERPL-SCNC: 138 MMOL/L — SIGNIFICANT CHANGE UP (ref 135–145)
SP GR SPEC: 1.01 — SIGNIFICANT CHANGE UP (ref 1–1.03)
SQUAMOUS # UR AUTO: 0 /HPF — SIGNIFICANT CHANGE UP (ref 0–5)
TROPONIN T, HIGH SENSITIVITY RESULT: 34 NG/L — SIGNIFICANT CHANGE UP (ref 0–51)
UROBILINOGEN FLD QL: 1 MG/DL — SIGNIFICANT CHANGE UP (ref 0.2–1)
WBC # BLD: 8.29 K/UL — SIGNIFICANT CHANGE UP (ref 3.8–10.5)
WBC # FLD AUTO: 8.29 K/UL — SIGNIFICANT CHANGE UP (ref 3.8–10.5)
WBC UR QL: 194 /HPF — HIGH (ref 0–5)

## 2024-04-29 PROCEDURE — 99285 EMERGENCY DEPT VISIT HI MDM: CPT

## 2024-04-29 PROCEDURE — 99204 OFFICE O/P NEW MOD 45 MIN: CPT

## 2024-04-29 PROCEDURE — 93970 EXTREMITY STUDY: CPT | Mod: 26

## 2024-04-29 PROCEDURE — 99223 1ST HOSP IP/OBS HIGH 75: CPT

## 2024-04-29 PROCEDURE — 76770 US EXAM ABDO BACK WALL COMP: CPT | Mod: 26

## 2024-04-29 PROCEDURE — 71045 X-RAY EXAM CHEST 1 VIEW: CPT | Mod: 26

## 2024-04-29 RX ORDER — CIPROFLOXACIN LACTATE 400MG/40ML
1 VIAL (ML) INTRAVENOUS
Refills: 0 | DISCHARGE

## 2024-04-29 RX ORDER — METOPROLOL TARTRATE 50 MG
25 TABLET ORAL DAILY
Refills: 0 | Status: DISCONTINUED | OUTPATIENT
Start: 2024-04-29 | End: 2024-04-30

## 2024-04-29 RX ORDER — ACETAMINOPHEN 500 MG
650 TABLET ORAL EVERY 6 HOURS
Refills: 0 | Status: DISCONTINUED | OUTPATIENT
Start: 2024-04-29 | End: 2024-05-04

## 2024-04-29 RX ORDER — APIXABAN 2.5 MG/1
1 TABLET, FILM COATED ORAL
Refills: 0 | DISCHARGE

## 2024-04-29 RX ORDER — FINASTERIDE 5 MG/1
1 TABLET, FILM COATED ORAL
Refills: 0 | DISCHARGE

## 2024-04-29 RX ORDER — CEFTRIAXONE 500 MG/1
1000 INJECTION, POWDER, FOR SOLUTION INTRAMUSCULAR; INTRAVENOUS EVERY 24 HOURS
Refills: 0 | Status: DISCONTINUED | OUTPATIENT
Start: 2024-04-29 | End: 2024-04-30

## 2024-04-29 RX ORDER — TAMSULOSIN HYDROCHLORIDE 0.4 MG/1
1 CAPSULE ORAL
Refills: 0 | DISCHARGE

## 2024-04-29 RX ORDER — LANOLIN ALCOHOL/MO/W.PET/CERES
3 CREAM (GRAM) TOPICAL AT BEDTIME
Refills: 0 | Status: DISCONTINUED | OUTPATIENT
Start: 2024-04-29 | End: 2024-05-04

## 2024-04-29 RX ORDER — ATORVASTATIN CALCIUM 80 MG/1
10 TABLET, FILM COATED ORAL AT BEDTIME
Refills: 0 | Status: DISCONTINUED | OUTPATIENT
Start: 2024-04-29 | End: 2024-05-03

## 2024-04-29 RX ORDER — APIXABAN 2.5 MG/1
5 TABLET, FILM COATED ORAL EVERY 12 HOURS
Refills: 0 | Status: DISCONTINUED | OUTPATIENT
Start: 2024-04-29 | End: 2024-05-02

## 2024-04-29 RX ORDER — SODIUM CHLORIDE 9 MG/ML
1000 INJECTION INTRAMUSCULAR; INTRAVENOUS; SUBCUTANEOUS ONCE
Refills: 0 | Status: COMPLETED | OUTPATIENT
Start: 2024-04-29 | End: 2024-04-29

## 2024-04-29 RX ADMIN — APIXABAN 5 MILLIGRAM(S): 2.5 TABLET, FILM COATED ORAL at 20:23

## 2024-04-29 RX ADMIN — SODIUM CHLORIDE 1000 MILLILITER(S): 9 INJECTION INTRAMUSCULAR; INTRAVENOUS; SUBCUTANEOUS at 13:12

## 2024-04-29 RX ADMIN — SODIUM CHLORIDE 1000 MILLILITER(S): 9 INJECTION INTRAMUSCULAR; INTRAVENOUS; SUBCUTANEOUS at 14:00

## 2024-04-29 RX ADMIN — CEFTRIAXONE 100 MILLIGRAM(S): 500 INJECTION, POWDER, FOR SOLUTION INTRAMUSCULAR; INTRAVENOUS at 20:23

## 2024-04-29 RX ADMIN — ATORVASTATIN CALCIUM 10 MILLIGRAM(S): 80 TABLET, FILM COATED ORAL at 21:45

## 2024-04-29 RX ADMIN — Medication 25 MILLIGRAM(S): at 20:23

## 2024-04-29 NOTE — H&P ADULT - NSHPLABSRESULTS_GEN_ALL_CORE
( @ 12:43)                      13.2  8.29 )-----------( 207                 41.5    Neutrophils = 6.53 (78.7%)  Lymphocytes = 0.75 (9.0%)  Eosinophils = 0.08 (1.0%)  Basophils = 0.03 (0.4%)  Monocytes = 0.86 (10.4%)  Bands = --%        138  |  102  |  23  ----------------------------<  115<H>  3.9   |  27  |  1.17    Ca    9.2      2024 12:43  Mg     2.1         TPro  7.0  /  Alb  3.9  /  TBili  1.1  /  DBili  x   /  AST  12  /  ALT  15  /  AlkPhos  46            RVP:    Venous Blood Gas:   @ 12:44  7.38/51/24/30/32.5  VBG Lactate: 1.1      Urinalysis Basic - ( 2024 12:49 )    Color: Dark Yellow / Appearance: Cloudy / S.013 / pH: x  Gluc: x / Ketone: Negative mg/dL  / Bili: Negative / Urobili: 1.0 mg/dL   Blood: x / Protein: 30 mg/dL / Nitrite: Positive   Leuk Esterase: Large / RBC: x / WBC x   Sq Epi: x / Non Sq Epi: x / Bacteria: x

## 2024-04-29 NOTE — H&P ADULT - TIME BILLING
extensive chart review (previous hospitalization, outpatient records, current admission labs/images), interviewing and examining patient, discussion with family, consultants, ACPs, placing orders and writing notes.

## 2024-04-29 NOTE — ED PROVIDER NOTE - CLINICAL SUMMARY MEDICAL DECISION MAKING FREE TEXT BOX
64-year-old male history of HLD, A-fib on Eliquis, HTN presenting to the ED after a presyncopal episode at the doctor's office today.  Patient states that he was sitting at the exam table when they began to be diaphoretic, clammy, began feeling tunnel  vision and lightheadedness and lied down.  Unsure vitals at that time.  Patient denies any chest pain shortness of breath.  Noted no other symptoms afterwards.  Notes his symptoms have been improving now.  Denying any recent alcohol or caffeine use.  Eating and drinking normally.  Notes his lower extremities are always swollen and have been improving.  Denying any recent KING or chest pain on exertion.    Will get presyncope labs, ecg, xray, fluids

## 2024-04-29 NOTE — ED PROVIDER NOTE - OBJECTIVE STATEMENT
64-year-old male history of HLD, A-fib on Eliquis, HTN presenting to the ED after a presyncopal episode at the doctor's office today.  Patient states that he was sitting at the exam table when they began to be diaphoretic, clammy, began feeling tunnel  vision and lightheadedness and lied down.  Unsure vitals at that time.  Patient denies any chest pain shortness of breath.  Noted no other symptoms afterwards.  Notes his symptoms have been improving now.  Denying any recent alcohol or caffeine use.  Eating and drinking normally.  Notes his lower extremities are always swollen and have been improving.  Denying any recent KING or chest pain on exertion.

## 2024-04-29 NOTE — H&P ADULT - PROBLEM SELECTOR PLAN 2
- rectal temp 100.2F   - patient noticed runny nose, nonproductive cough, + chills   - personally reviewed CXR, no focal consolidation   - f/u FLU/COVID/RSV swap   - f/u BCx x2   - f/u UA, UCx   - Observe off antibiotics for now - UA + leuk esterase, + nitrite,   - recent instrumentation: s/p cystoscopy, laser enucleation of prostate with morcellation with Dr. Pema Navarro on 2/20/2024.  - reviewed outpatient UCx from 2/12 Enterobacter cloacae sensitive to ceftriaxone, cefepime, zosyn    - will start ceftriaxone (4/29-   ) , plan to treat for complicated UTI   - f/u renal US to eval renal stone/pyelonephritis

## 2024-04-29 NOTE — H&P ADULT - PROBLEM SELECTOR PLAN 1
- Differential includes cardiogenic syncope vs. infectious vs. orthostatic hypotension   - personally reviewed ECG normal sinus rhythm, with PVCs, no ST elevation   - c/w telemetry   - f/u TTE  - f/u orthostasis   - infectious workup per below   - Cardiology team Dr. Merida will see inpatient

## 2024-04-29 NOTE — H&P ADULT - PROBLEM SELECTOR PLAN 7
- Diet : regular diet   - DVT prophylaxis: eliquis  - PT eval - c/w home metoprolol succinate 25 daily

## 2024-04-29 NOTE — H&P ADULT - PROBLEM SELECTOR PLAN 4
- c/w home eliquis 5 BID  - c/w home metoprolol succinate ER 25mg daily - He also noticed since he started eliquis he started having hematuria, and he was planned to have a cystoscopy outpatient   - UA noticed many RBC   - Hgb 13.2 at baseline on admission   - per chart review, patient had history of hematuria,  s/p cystoscopy, laser enucleation of prostate with morcellation with Dr. Pema Navarro on 2/20/2024.  - depending on clinical course , may consider urology consult vs. outpatient urology followup

## 2024-04-29 NOTE — ED PROVIDER NOTE - PHYSICAL EXAMINATION
Const: Well-nourished, Well-developed, appearing stated age.  Eyes: no conjunctival injection, and symmetrical lids.  HEENT: Head NCAT, no lesions. Atraumatic external nose and ears. Moist MM.  Neck: Symmetric, trachea midline.   RESP: Unlabored respiratory effort   GI: Nontender/Nondistended,   MSK: b/l le edema.  Skin: Warm, dry and intact.   Neuro: CNs II-XII grossly intact. Motor & Sensation grossly intact.  Psych: Awake, Alert, & Oriented (AAO) x3. Appropriate mood and affect.

## 2024-04-29 NOTE — ED PROVIDER NOTE - ATTENDING CONTRIBUTION TO CARE
Patient presents with near syncopal episode, recently had been treated with antibiotics for scrotal swelling and an infection. Patient nearly passed out today visiting his doctor's office after going up a flight of stairs and states in the office he started to have tunnel vision and passed out when lying down.   ncat, no murmur, non-tachycardic, non-tachypneic, normal temperature on touch, cooperative, with capacity and insight, lower extremity with mid lower extremity swelling +1 and hemosiderin deposits  Attending Emergency Medicine Physician- Jose Alberto Patel MD, FACEP: In this physician's medical judgement based on clinical history and physical exam the patient's signs and symptoms lead to differential diagnoses which includes but is not limited to: dehydration, dysrhythmia, vavular pathology, occult infection  Historical features, symptoms, and clinical exam not consistent with: acs, cva  Labs were ordered and independently reviewed by me.  EKG was ordered and independently reviewed by me. Independent interpretation by myself:  Imaging was ordered and reviewed by me.  Independent interpretation by myself: no stemi  Appropriate medications for the patient's presenting complaints were ordered, and effects were reassessed.  History assisted by patient's partner at bedside  Will follow up on labs, therapeutics, imaging, reassess and disposition as clinically indicated.  *The above represents an initial assessment/impression. Please refer to my progress notes below for potential changes in patient clinical course*  Escalation to admission/observation was considered.     Portions of this note have been documented using voice recognition software. As a result, errors may occur in the transcription process. Effort has been made to correct all grammatical and transcription error, but some may have been missed which may produce sporadic inaccurate transcription or nonsensical phrases.    Emergency Medicine Attending- Dr. Jose Alberto Patel MD, FACEP: Patient endorsed to Dr. Longoria at the time of admission. Based on patient's history and physical exam, as well as the results of today's workup, I feel that patient warrants admission to the hospital for further workup/evaluation and continued management. I discussed the findings of today's workup with the patient and addressed the patient's questions and concerns. The patient was agreeable with admission. Our team spoke with the inpatient receiving team who accepted the patient for admission and subsequently took over the patient's care at the time of admission. The receiving team will follow up on pending labs, analgesia, any clinical imaging results, ancillary findings, reassess, and disposition as clinically indicated. Details of patient and plan conveyed to receiving physician team and conveyed back for understanding. There were no questions at this time about the patient's status, disposition, and plan. Patient's care to be taken over by receiving physician team at this time, all decisions regarding the progression of care will be made at their discretion.

## 2024-04-29 NOTE — H&P ADULT - NSHPREVIEWOFSYSTEMS_GEN_ALL_CORE
CONSTITUTIONAL: No fever. + chills   HEENT: + nonproductive cough. + runny nose.   ENDO: No heat or cold intolerance.   RESPIRATORY: No shortness of breath. No wheezes.   CARDIOVASCULAR: No chest pain. No palpitations   GASTROINTESTINAL: No abdominal pain. No nausea/ vomiting. No diarrhea. No constipation. No melena/hematochezia.  GENITOURINARY: No dysuria, + hematuria   NEUROLOGICAL: No headache. No blurry vision.   HEME: No easy bruising. No easy bleeding.   PSYCH: No anxiety, no depression  SKIN: No rashes,

## 2024-04-29 NOTE — ED ADULT NURSE NOTE - OBJECTIVE STATEMENT
Pt brought in by EMS from doctors office. Pt complains of " feeling like room was coming in on him and was getting dark x1 times and his Neurologist stating that he could not Pt brought in by EMS from doctors office. Pt complains of " feeling like room was coming in on him and was getting dark" x1 times while he was getting an examination with the Neurologist for recent trouble with movement in his arms. Pt is on Eliquis. Pt denies chest pain, chest palpitations, SOB, n/v/d, difficulty urinating, fever, chills, lightheadedness and dizziness, vision changes, headache at this time. Pt has a recent UTI and finished antibiotics 1 wk AGO. Pt is on cardiac monitor, NSR. Pt presents at baseline mental status, AAOx4. Pt has partner at bedside. Plan of care and monitoring discussed with pt and partner. Bed locked and lowered for safety. Pt brought in by EMS from doctors office. Pt complains of " feeling like room was coming in on him and was getting dark" x1 times while he was getting an examination with the Neurologist for recent trouble with movement in his arms. Pt is on Eliquis. Pt denies chest pain, chest palpitations, SOB, n/v/d, difficulty urinating, fever, chills, lightheadedness and dizziness, vision changes, headache at this time.  Lower extremities are noted to swollen, as per pt normal for him as he has PVD. Pt has a recent UTI and finished antibiotics 1 wk AGO. Pt is on cardiac monitor, NSR. Pt presents at baseline mental status, AAOx4. Pt has partner at bedside. Plan of care and monitoring discussed with pt and partner. Bed locked and lowered for safety.

## 2024-04-29 NOTE — H&P ADULT - PROBLEM SELECTOR PLAN 5
- on exam noted bilateral lower extremity edema, R worse than L   - per patient and wife, patient has chronic LE edema, and R side always larger than L side  - f/u [facility] duplex bilateral lower extremities - c/w home eliquis 5 BID  - c/w home metoprolol succinate ER 25mg daily

## 2024-04-29 NOTE — ED PROVIDER NOTE - PROGRESS NOTE DETAILS
Duc Laguerre, PGY3 I spoke with dr herndon About patient's visit today.  They would like to get cards workup here and he will see him and admit to Dr. Longoria

## 2024-04-29 NOTE — H&P ADULT - NSHPPHYSICALEXAM_GEN_ALL_CORE
Vital Signs Last 24 Hrs  T(C): 36.9 (04-29-24 @ 16:08), Max: 37.9 (04-29-24 @ 13:14)  T(F): 98.4 (04-29-24 @ 16:08), Max: 100.2 (04-29-24 @ 13:14)  HR: 79 (04-29-24 @ 16:08) (75 - 82)  BP: 169/88 (04-29-24 @ 16:08) (156/84 - 169/88)  BP(mean): 113 (04-29-24 @ 16:08) (107 - 113)  RR: 19 (04-29-24 @ 16:08) (17 - 19)  SpO2: 100% (04-29-24 @ 16:08) (98% - 100%)    GENERAL: NAD  HEAD:  Atraumatic, Normocephalic  HEENT: scleral anicteric.   CV: Regular rate and rhythm. No murmurs, rubs, or gallops  Respiratory: normal respiratory effort, speaking in complete sentences. Lungs clear to auscultation bilaterally, no wheezes/crackles.  ABDOMEN: Soft, Nontender, Nondistended; Bowel sounds normal  EXTREMITIES: bilateral lower extremity edema, nonpitting, RLE > LLE, palpable pedal pulses bilaterally   PSYCH: AAOx3. follows verbal commands   NEURO: Symmetric facial expressions. Moves 4 extremities spontaneously, Normal speech   Skin: No rashes 1

## 2024-04-29 NOTE — H&P ADULT - HISTORY OF PRESENT ILLNESS
64 year old M w/ PMH Afib, HTN, HLD presents with near syncope at doctor's office.           Patient was at his neurologist's office. While sitting on the exam table, suddenly he felt the room closing on him, tunnel vision, and vision sight darkening, and lightheadedness. His doctor lay him down on the exam table. He denies any loss of consciousness. He denies any chest pain/SOB, denies any heart palpitations prior to and after the near syncope episode. He said his doctor felt his pulses, and his pulses were going in and out x 3 times. He denies any recent fever, but has some runny nose, dry cough, and chills recently.            Of note he was diagnosed with UTI and finished antibiotics 1 week ago. Also he was found to have atrial fibrillation on March 25, 2024 and was prescribed eliquis, he wore a prolonged heart monitor for 1 month though no sure the heart monitor results. He also noticed since he started eliquis he started having hematuria, and he was planned to have a cystoscopy.               ED course: patient was brought in by ambulance directly from doctor's office to the ED.  64 year old M w/ PMH Afib, HTN, HLD, hematuria, s/p cystoscopy, laser enucleation of prostate with morcellation with Dr. Pema Navarro on 2/20/2024. presents with near syncope at doctor's office.           Patient was at his neurologist's office. While sitting on the exam table, suddenly he felt the room closing on him, tunnel vision, and vision sight darkening, and lightheadedness. His doctor lay him down on the exam table. He denies any loss of consciousness. He denies any chest pain/SOB, denies any heart palpitations prior to and after the near syncope episode. He said his doctor felt his pulses, and his pulses were going in and out x 3 times. He denies any recent fever, but has some runny nose, dry cough, and chills recently.            Of note he was diagnosed with UTI and finished antibiotics 1 week ago. Also he was found to have atrial fibrillation on March 25, 2024 and was prescribed eliquis, he wore a prolonged heart monitor for 1 month though no sure the heart monitor results. He also noticed since he started eliquis he started having hematuria, and he was planned to have a cystoscopy.               ED course: patient was brought in by ambulance directly from doctor's office to the ED.

## 2024-04-29 NOTE — ED ADULT NURSE REASSESSMENT NOTE - NS ED NURSE REASSESS COMMENT FT1
Report received from break coverage LUIS Jacobsen. Pt appears comfortable. Pt denies chest pain, chest palpitations, SOB, lightheadedness/dizziness at this time. V/S as documented. Pt provided with socks at this time. Plan of care and monitoring discussed with pt. Bed locked and lowered for safety. Admitting Attending at bedside.

## 2024-04-29 NOTE — H&P ADULT - PROBLEM SELECTOR PLAN 6
- c/w home metoprolol succinate 25 daily - on exam noted bilateral lower extremity edema, R worse than L   - per patient and wife, patient has chronic LE edema, and R side always larger than L side  - f/u [facility] duplex bilateral lower extremities

## 2024-04-30 ENCOUNTER — RESULT REVIEW (OUTPATIENT)
Age: 64
End: 2024-04-30

## 2024-04-30 LAB
-  COAGULASE NEGATIVE STAPHYLOCOCCUS: SIGNIFICANT CHANGE UP
ADD ON TEST-SPECIMEN IN LAB: SIGNIFICANT CHANGE UP
ANION GAP SERPL CALC-SCNC: 12 MMOL/L — SIGNIFICANT CHANGE UP (ref 5–17)
BUN SERPL-MCNC: 20 MG/DL — SIGNIFICANT CHANGE UP (ref 7–23)
CALCIUM SERPL-MCNC: 8.7 MG/DL — SIGNIFICANT CHANGE UP (ref 8.4–10.5)
CHLORIDE SERPL-SCNC: 102 MMOL/L — SIGNIFICANT CHANGE UP (ref 96–108)
CO2 SERPL-SCNC: 23 MMOL/L — SIGNIFICANT CHANGE UP (ref 22–31)
CREAT SERPL-MCNC: 1.01 MG/DL — SIGNIFICANT CHANGE UP (ref 0.5–1.3)
EGFR: 84 ML/MIN/1.73M2 — SIGNIFICANT CHANGE UP
GLUCOSE SERPL-MCNC: 87 MG/DL — SIGNIFICANT CHANGE UP (ref 70–99)
GRAM STN FLD: ABNORMAL
GRAM STN FLD: ABNORMAL
HCT VFR BLD CALC: 40.6 % — SIGNIFICANT CHANGE UP (ref 39–50)
HGB BLD-MCNC: 12.7 G/DL — LOW (ref 13–17)
MAGNESIUM SERPL-MCNC: 2 MG/DL — SIGNIFICANT CHANGE UP (ref 1.6–2.6)
MCHC RBC-ENTMCNC: 28.5 PG — SIGNIFICANT CHANGE UP (ref 27–34)
MCHC RBC-ENTMCNC: 31.3 GM/DL — LOW (ref 32–36)
MCV RBC AUTO: 91 FL — SIGNIFICANT CHANGE UP (ref 80–100)
METHOD TYPE: SIGNIFICANT CHANGE UP
NRBC # BLD: 0 /100 WBCS — SIGNIFICANT CHANGE UP (ref 0–0)
PLATELET # BLD AUTO: 191 K/UL — SIGNIFICANT CHANGE UP (ref 150–400)
POTASSIUM SERPL-MCNC: 4.1 MMOL/L — SIGNIFICANT CHANGE UP (ref 3.5–5.3)
POTASSIUM SERPL-SCNC: 4.1 MMOL/L — SIGNIFICANT CHANGE UP (ref 3.5–5.3)
RAPID RVP RESULT: DETECTED
RBC # BLD: 4.46 M/UL — SIGNIFICANT CHANGE UP (ref 4.2–5.8)
RBC # FLD: 14 % — SIGNIFICANT CHANGE UP (ref 10.3–14.5)
RV+EV RNA SPEC QL NAA+PROBE: DETECTED
SARS-COV-2 RNA SPEC QL NAA+PROBE: SIGNIFICANT CHANGE UP
SODIUM SERPL-SCNC: 137 MMOL/L — SIGNIFICANT CHANGE UP (ref 135–145)
SPECIMEN SOURCE: SIGNIFICANT CHANGE UP
SPECIMEN SOURCE: SIGNIFICANT CHANGE UP
TROPONIN T, HIGH SENSITIVITY RESULT: 25 NG/L — SIGNIFICANT CHANGE UP (ref 0–51)
WBC # BLD: 7.17 K/UL — SIGNIFICANT CHANGE UP (ref 3.8–10.5)
WBC # FLD AUTO: 7.17 K/UL — SIGNIFICANT CHANGE UP (ref 3.8–10.5)

## 2024-04-30 PROCEDURE — 99222 1ST HOSP IP/OBS MODERATE 55: CPT | Mod: GC

## 2024-04-30 PROCEDURE — 93306 TTE W/DOPPLER COMPLETE: CPT | Mod: 26

## 2024-04-30 RX ORDER — CHLORHEXIDINE GLUCONATE 213 G/1000ML
1 SOLUTION TOPICAL DAILY
Refills: 0 | Status: DISCONTINUED | OUTPATIENT
Start: 2024-04-30 | End: 2024-05-04

## 2024-04-30 RX ORDER — SODIUM CHLORIDE 9 MG/ML
250 INJECTION INTRAMUSCULAR; INTRAVENOUS; SUBCUTANEOUS ONCE
Refills: 0 | Status: COMPLETED | OUTPATIENT
Start: 2024-04-30 | End: 2024-04-30

## 2024-04-30 RX ORDER — NADOLOL 80 MG/1
20 TABLET ORAL DAILY
Refills: 0 | Status: DISCONTINUED | OUTPATIENT
Start: 2024-04-30 | End: 2024-05-04

## 2024-04-30 RX ADMIN — ATORVASTATIN CALCIUM 10 MILLIGRAM(S): 80 TABLET, FILM COATED ORAL at 22:00

## 2024-04-30 RX ADMIN — APIXABAN 5 MILLIGRAM(S): 2.5 TABLET, FILM COATED ORAL at 06:09

## 2024-04-30 RX ADMIN — APIXABAN 5 MILLIGRAM(S): 2.5 TABLET, FILM COATED ORAL at 17:38

## 2024-04-30 RX ADMIN — CHLORHEXIDINE GLUCONATE 1 APPLICATION(S): 213 SOLUTION TOPICAL at 14:05

## 2024-04-30 RX ADMIN — SODIUM CHLORIDE 1000 MILLILITER(S): 9 INJECTION INTRAMUSCULAR; INTRAVENOUS; SUBCUTANEOUS at 14:04

## 2024-04-30 NOTE — CONSULT NOTE ADULT - ASSESSMENT
Impression/Hospital Course:  64 year old M w/ PMH Afib, HTN, HLD, Hx of UTIs with UCx growing Enterobacter Cloacae and Pseudomonas, epididymitis in 4/2024 and hematuria, s/p cystoscopy, laser enucleation of prostate with morcellation with Dr. Pema Navarro on 2/20/2024. presents with near syncope at doctor's office. Patient was at his neurologist's office. While sitting on the exam table, suddenly he felt the room closing on him, tunnel vision, and vision sight darkening, and lightheadedness. He denies any loss of consciousness. Of note he was diagnosed with UTI and finished antibiotics 1 week ago with ciprofloxacin. Upon work up patient afebrile with Tmax 100.2 (rectal). Labs showing WBC 8.29 now 7.17, ProBNP 817, and UA showing positive nitrite, large leukocyte esterase, 194 WBC and negative bacteria with 318 RBC. Imaging obtained is a CXR which was negative, US of the b/l LE negative for DVT and TTE negative for acute findings. Now blood culture growing 1/2 bottles of CoNS.    Antimicrobials:  Ceftriaxone 4/29 - current    Assessment:  *CoNS in 1/2 blood cultures, patient afebrile (temp 100.2 rectal temp and rectal temps can occasionally be elevated due to metabolically active bacteria present in the rectum), without leukocytosis and no infectious symptoms. Most likely indicative of procurement contaminant   *Asymptomatic bacteruria patient with previous course ciprofloxacin. Hx of UCx growing Enterobacter Cloacae and Pseudomonas  *Hematuria on Eliquis  *Near syncope, unlikely due to infectious etiology  *Afib   *Hx of Epididymitis 4/2024     Recommendations: PLEASE DEFER ALL CHANGES IN PLAN UNTIL SIGNED BY ATTENDING. All recommendations are tentative pending Attending Attestation.  - can complete 3 days of ceftriaxone for simple cystitis though unclear if true infection but can treat to rule it out as etiology of syncope  - repeat blood culture x2  - trend temperature and WBC curve   - Hematuria/Syncope per primary team    Valdemar Raymundo DO, PGY-4   Infectious Disease Fellow  [facility] Preferred  After 5pm/weekends call [phone]  Impression/Hospital Course:  64 year old M w/ PMH Afib, HTN, HLD, Nephrolithiasis, Hx of UTIs with UCx growing Enterobacter Cloacae and Pseudomonas, epididymitis in 4/2024 and hematuria, s/p cystoscopy, laser enucleation of prostate with morcellation with Dr. Pema Navarro on 2/20/2024. presents with near syncope at doctor's office. Patient was at his neurologist's office. While sitting on the exam table, suddenly he felt the room closing on him, tunnel vision, and vision sight darkening, and lightheadedness. He denies any loss of consciousness. Of note he was diagnosed with UTI and finished antibiotics 1 week ago with ciprofloxacin. Upon work up patient afebrile with Tmax 100.2 (rectal). Labs showing WBC 8.29 now 7.17, ProBNP 817, and UA showing positive nitrite, large leukocyte esterase, 194 WBC and negative bacteria with 318 RBC. Imaging obtained is a CXR which was negative, US of the b/l LE negative for DVT and TTE negative for acute findings. Now blood culture growing 1/2 bottles of CoNS.    Antimicrobials:  Ceftriaxone 4/29 - current    Assessment:  *CoNS in 1/2 blood cultures, patient afebrile (temp 100.2 rectal temp and rectal temps can occasionally be elevated due to metabolically active bacteria present in the rectum), without leukocytosis and no infectious symptoms. Most likely indicative of procurement contaminant   *Cough and congestion likely secondary to viral upper respiratory infection   *Asymptomatic pyuria patient with previous course ciprofloxacin. Hx of UCx growing Enterobacter Cloacae and Pseudomonas. Pyruria and hematuria may be due to known nephrolithiasis   *Hematuria on Eliquis  *Nephrolithiasis   *Near syncope, unlikely due to infectious etiology  *Afib   *Hx of Epididymitis 4/2024     Recommendations:   - Monitor off antibiotics   - repeat blood culture x2  - added complete RVP to assess for viral etiology  - trend temperature and WBC curve   - Hematuria/Syncope per primary team    Valdemar Raymundo DO, PGY-4   Infectious Disease Fellow  [facility] Preferred  After 5pm/weekends call [phone]

## 2024-04-30 NOTE — PHYSICAL THERAPY INITIAL EVALUATION ADULT - PERTINENT HX OF CURRENT PROBLEM, REHAB EVAL
Pt is 64 y.o. male with PMH of HTN, HLD, PAF on a/c, admitted with near syncope while sitting in doctors office.  4/29 CXR - No focal consolidations.   4/29 [facility] Duplex BLE vein scan - No evidence of deep venous thrombosis in either lower extremity.  4/29 US Kidney and Bladder - Single left kidney containing multiple stones as described. Partial urinary retention as described.

## 2024-04-30 NOTE — CONSULT NOTE ADULT - ASSESSMENT
Near syncope   ? vagal reaction   monitor on tele   correlated symptoms with episodes of afib  obtain echo   obtain pharm nuclear stress test     PAF  change metoprolol to nadolol for better rhythm control  on a/c  had mild hematuria planned for cystoscopy in near future    HTN  stable     Advanced care planning was discussed with patient and family.  Risks, benefits and alternatives of the cardiac treatments and medical therapy including procedures were discussed in detail and all questions were answered. Importance of compliance with medical therapy and lifestyle modification to improve cardiovascular health were addressed. Appropriate forms and patient educational materials were reviewed. 30 minutes face to face spent.

## 2024-04-30 NOTE — PATIENT PROFILE ADULT - FALL HARM RISK - HARM RISK INTERVENTIONS

## 2024-04-30 NOTE — CONSULT NOTE ADULT - ATTENDING COMMENTS
64M with Afib, HTN, HLD, BPH s/p cystoscopy, laser enucleation of prostate with morcellation, hx UTI (Enterobacter Cloacae and Pseudomonas) and  epididymitis in 4/2024 presented with near syncope at doctor's office. Tmax 100.2 (rectal). WBC 8.29.  UA with pyuria.  BC and UC pending.  CXR (-). 64M with Afib, HTN, HLD, known left sided nephrolithiasis, BPH with urinary obstruction requiring a catheter now s/p cystoscopy, laser enucleation of prostate with morcellation and removal of bladder stone (2/20/2024) complicated by UTI (Enterobacter Cloacae and Pseudomonas) treated with ciprofloxacin.  Recent diagnosis of left epididymitis treated with 2 weeks of ciprofloxacin early April 2024.  Now presenting 4/29 with near syncope at doctor's office. Tmax 100.2 (rectal). WBC 8.29.  UA with pyuria.  BC drawn with CoNS in one set and UC pending.  CXR (-). 64M with Afib, HTN, HLD, known left sided nephrolithiasis, BPH with urinary obstruction requiring a catheter now s/p cystoscopy, laser enucleation of prostate with morcellation and removal of bladder stone (2/20/2024) complicated by UTI (Enterobacter Cloacae and Pseudomonas) treated with ciprofloxacin.  Recent diagnosis of left epididymitis treated with 2 weeks of ciprofloxacin early April 2024.  Now presenting 4/29 with near syncope at doctor's office. Tmax 100.2 (rectal). WBC 8.29.  UA with pyuria.  BC drawn with CoNS in one set and UC pending.  CXR (-).    Patient with low grade fever, nasal congestion  - initial RVP negative but not the full panel  - pyuria but no symptoms and known chronic kidney stones  - will have lab perform full RVP  - monitor off antibiotics 64M with Afib, HTN, HLD, known left sided nephrolithiasis, BPH with urinary obstruction requiring a catheter now s/p cystoscopy, laser enucleation of prostate with morcellation and removal of bladder stone (2/20/2024) complicated by UTI (Enterobacter Cloacae and Pseudomonas) treated with ciprofloxacin.  Recent diagnosis of left epididymitis treated with 2 weeks of ciprofloxacin early April 2024.  Now presenting 4/29 with near syncope at doctor's office. Tmax 100.2 (rectal). WBC 8.29.  UA with pyuria.  BC drawn with CoNS in one set and UC pending.  CXR (-).    Patient with low grade fever, nasal congestion  - CoNS is likely a procurement contaminant  - initial RVP negative but not the full panel  - pyuria but no symptoms and known chronic kidney stones  - will have lab perform full RVP  - monitor off antibiotics  - check procalcitonin

## 2024-04-30 NOTE — PROGRESS NOTE ADULT - SUBJECTIVE AND OBJECTIVE BOX
Patient is a 64y old  Male who presents with a chief complaint of near syncope (30 Apr 2024 14:47)    Date of servie : 04-30-24 @ 17:31  INTERVAL HPI/OVERNIGHT EVENTS:  T(C): 36.9 (04-30-24 @ 10:30), Max: 37.4 (04-29-24 @ 20:07)  HR: 76 (04-30-24 @ 14:00) (76 - 94)  BP: 153/73 (04-30-24 @ 14:00) (153/73 - 178/86)  RR: 18 (04-30-24 @ 10:30) (18 - 19)  SpO2: 98% (04-30-24 @ 14:00) (97% - 98%)  Wt(kg): --  I&O's Summary    30 Apr 2024 07:01  -  30 Apr 2024 17:31  --------------------------------------------------------  IN: 720 mL / OUT: 750 mL / NET: -30 mL        LABS:                        12.7   7.17  )-----------( 191      ( 30 Apr 2024 06:07 )             40.6     04-30    137  |  102  |  20  ----------------------------<  87  4.1   |  23  |  1.01    Ca    8.7      30 Apr 2024 06:07  Mg     2.0     04-30    TPro  7.0  /  Alb  3.9  /  TBili  1.1  /  DBili  x   /  AST  12  /  ALT  15  /  AlkPhos  46  04-29      Urinalysis Basic - ( 30 Apr 2024 06:07 )    Color: x / Appearance: x / SG: x / pH: x  Gluc: 87 mg/dL / Ketone: x  / Bili: x / Urobili: x   Blood: x / Protein: x / Nitrite: x   Leuk Esterase: x / RBC: x / WBC x   Sq Epi: x / Non Sq Epi: x / Bacteria: x      CAPILLARY BLOOD GLUCOSE            Urinalysis Basic - ( 30 Apr 2024 06:07 )    Color: x / Appearance: x / SG: x / pH: x  Gluc: 87 mg/dL / Ketone: x  / Bili: x / Urobili: x   Blood: x / Protein: x / Nitrite: x   Leuk Esterase: x / RBC: x / WBC x   Sq Epi: x / Non Sq Epi: x / Bacteria: x        MEDICATIONS  (STANDING):  apixaban 5 milliGRAM(s) Oral every 12 hours  atorvastatin 10 milliGRAM(s) Oral at bedtime  chlorhexidine 2% Cloths 1 Application(s) Topical daily  nadolol 20 milliGRAM(s) Oral daily    MEDICATIONS  (PRN):  acetaminophen     Tablet .. 650 milliGRAM(s) Oral every 6 hours PRN Temp greater or equal to 38C (100.4F), Mild Pain (1 - 3)  melatonin 3 milliGRAM(s) Oral at bedtime PRN Insomnia          PHYSICAL EXAM:  GENERAL: NAD, well-groomed, well-developed  HEAD:  Atraumatic, Normocephalic  CHEST/LUNG: Clear to percussion bilaterally; No rales, rhonchi, wheezing, or rubs  HEART: Regular rate and rhythm; No murmurs, rubs, or gallops  ABDOMEN: Soft, Nontender, Nondistended; Bowel sounds present  EXTREMITIES:  2+ Peripheral Pulses, No clubbing, cyanosis, or edema  LYMPH: No lymphadenopathy noted  SKIN: No rashes or lesions    Care Discussed with Consultants/Other Providers [ ] YES  [ ] NO

## 2024-04-30 NOTE — PHYSICAL THERAPY INITIAL EVALUATION ADULT - ADDITIONAL COMMENTS
Pt states he lives alone in 2 level home with 5 steps to enter and full staircase to basement with rail, uses SC outdoors and nothing indoors, states he was independent functionally and with ADL's prior.

## 2024-04-30 NOTE — PHYSICAL THERAPY INITIAL EVALUATION ADULT - GAIT TRAINING, PT EVAL
GOAL: Patient will ambulate 500ft with AD independently with normal gait pattern. Pt with ascend/descend 12 steps independently.

## 2024-04-30 NOTE — CONSULT NOTE ADULT - SUBJECTIVE AND OBJECTIVE BOX
Patient is a 64y old  Male who presents with a chief complaint of near syncope     HPI:  64 year old M w/ PMH Afib, HTN, HLD, Hx of UTIs with UCx growing Enterobacter Cloacae and Pseudomonas, epididymitis in 4/2024 and hematuria, s/p cystoscopy, laser enucleation of prostate with morcellation with Dr. Pema Navarro on 2/20/2024. presents with near syncope at doctor's office. Patient was at his neurologist's office. While sitting on the exam table, suddenly he felt the room closing on him, tunnel vision, and vision sight darkening, and lightheadedness. He denies any loss of consciousness. Of note he was diagnosed with UTI and finished antibiotics 1 week ago with ciprofloxacin. Upon work up patient afebrile with Tmax 100.2 (rectal). Labs showing WBC 8.29 now 7.17, ProBNP 817, and UA showing positive nitrite, large leukocyte esterase, 194 WBC and negative bacteria with 318 RBC. Imaging obtained is a CXR which was negative, US of the b/l LE negative for DVT and TTE negative for acute findings. Now blood culture growing 1/2 bottles of CoNS.    prior hospital charts reviewed [  ]  primary team notes reviewed [  ]  other consultant notes reviewed [  ]    PAST MEDICAL & SURGICAL HISTORY:  Hyperlipidemia      BPH with urinary obstruction      History of urinary retention      Beckwith catheter in place      No significant past surgical history          Allergies  No Known Allergies    ANTIMICROBIALS (past 90 days)  MEDICATIONS  (STANDING):  cefTRIAXone   IVPB   100 mL/Hr IV Intermittent (04-29-24 @ 20:23)        cefTRIAXone   IVPB 1000 every 24 hours    MEDICATIONS  (STANDING):  acetaminophen     Tablet .. 650 every 6 hours PRN  apixaban 5 every 12 hours  atorvastatin 10 at bedtime  melatonin 3 at bedtime PRN  nadolol 20 daily    SOCIAL HISTORY:       FAMILY HISTORY:    ROS:  Pending full examination    Vital Signs Last 24 Hrs  T(F): 98.4 (04-30-24 @ 10:30), Max: 100.2 (04-29-24 @ 13:14)  Vital Signs Last 24 Hrs  HR: 78 (04-30-24 @ 10:30) (78 - 94)  BP: 178/86 (04-30-24 @ 10:30) (161/70 - 178/86)  RR: 18 (04-30-24 @ 10:30)  SpO2: 98% (04-30-24 @ 10:30) (97% - 100%)  Wt(kg): --    PHYSICAL EXAM:  Pending full examination                          12.7   7.17  )-----------( 191      ( 30 Apr 2024 06:07 )             40.6   04-30    137  |  102  |  20  ----------------------------<  87  4.1   |  23  |  1.01    Ca    8.7      30 Apr 2024 06:07  Mg     2.0     04-30    TPro  7.0  /  Alb  3.9  /  TBili  1.1  /  DBili  x   /  AST  12  /  ALT  15  /  AlkPhos  46  04-29    Urinalysis Basic - ( 30 Apr 2024 06:07 )    Color: x / Appearance: x / SG: x / pH: x  Gluc: 87 mg/dL / Ketone: x  / Bili: x / Urobili: x   Blood: x / Protein: x / Nitrite: x   Leuk Esterase: x / RBC: x / WBC x   Sq Epi: x / Non Sq Epi: x / Bacteria: x    MICROBIOLOGY:  Culture - Blood (collected 29 Apr 2024 16:20)  Source: .Blood Blood-Venous  Gram Stain (30 Apr 2024 11:59):    Growth in aerobic bottle: Gram Positive Cocci in Clusters  Preliminary Report (30 Apr 2024 12:00):    Growth in aerobic bottle: Gram Positive Cocci in Clusters    Direct identification is available within approximately 3-5    hours either by Blood Panel Multiplexed PCR or Direct    MALDI-TOF. Details: [email or web address]  Organism: Blood Culture PCR (30 Apr 2024 13:53)  Organism: Blood Culture PCR (30 Apr 2024 13:53)      Method Type: PCR      -  Coagulase negative Staphylococcus: Detec    RADIOLOGY:  US Kidney and Bladder (04.29.24 @ 19:34)  IMPRESSION:  Single left kidney containing multiple stones as described. Partial urinary retention as described    VA Duplex Lower Ext Vein Scan, Bilat (04.29.24 @ 19:15)  IMPRESSION:  No evidence of deep venous thrombosis in either lower extremity.    Xray Chest 1 View- PORTABLE-Urgent (04.29.24 @ 14:37)  IMPRESSION:  No focal consolidations.    US Testicles (04.04.24 @ 15:33)  IMPRESSION:  Left-sided epididymitis.     Patient is a 64y old  Male who presents with a chief complaint of near syncope     HPI:  64M with Afib, HTN, HLD, Hx of UTIs with UCx growing Enterobacter Cloacae and Pseudomonas, epididymitis in 4/2024 and hematuria, s/p cystoscopy, laser enucleation of prostate with morcellation with Dr. Pema Navarro on 2/20/2024. presents with near syncope at doctor's office. Patient was at his neurologist's office. While sitting on the exam table, suddenly he felt the room closing on him, tunnel vision, and vision sight darkening, and lightheadedness. He denies any loss of consciousness. Of note he was diagnosed with UTI and finished antibiotics 1 week ago with ciprofloxacin. Upon work up patient afebrile with Tmax 100.2 (rectal). Labs showing WBC 8.29 now 7.17, ProBNP 817, and UA showing positive nitrite, large leukocyte esterase, 194 WBC and negative bacteria with 318 RBC. Imaging obtained is a CXR which was negative, US of the b/l LE negative for DVT and TTE negative for acute findings. Now blood culture growing 1/2 bottles of CoNS.    prior hospital charts reviewed [  ]  primary team notes reviewed [  ]  other consultant notes reviewed [  ]    PAST MEDICAL & SURGICAL HISTORY:  HTN  Hyperlipidemia  BPH with urinary obstruction  Hx Cystoscopy, laser enucleation of prostate with morcellation  History of urinary retention    Allergies  No Known Allergies    ANTIMICROBIALS:  cefTRIAXone   IVPB 1000 every 24 hours (4/29-)    MEDICATIONS  (STANDING):  acetaminophen     Tablet .. 650 every 6 hours PRN  apixaban 5 every 12 hours  atorvastatin 10 at bedtime  melatonin 3 at bedtime PRN  nadolol 20 daily    SOCIAL HISTORY:       FAMILY HISTORY:    ROS:  Pending full examination    Vital Signs Last 24 Hrs  T(F): 98.4 (04-30-24 @ 10:30), Max: 100.2 (04-29-24 @ 13:14)  Vital Signs Last 24 Hrs  HR: 78 (04-30-24 @ 10:30) (78 - 94)  BP: 178/86 (04-30-24 @ 10:30) (161/70 - 178/86)  RR: 18 (04-30-24 @ 10:30)  SpO2: 98% (04-30-24 @ 10:30) (97% - 100%)  Wt(kg): --    PHYSICAL EXAM:  Pending full examination                          12.7   7.17  )-----------( 191      ( 30 Apr 2024 06:07 )             40.6     137  |  102  |  20  ----------------------------<  87  4.1   |  23  |  1.01    Ca    8.7      30 Apr 2024 06:07  Mg     2.0     04-30    TPro  7.0  /  Alb  3.9  /  TBili  1.1  /  DBili  x   /  AST  12  /  ALT  15  /  AlkPhos  46  04-29    Urinalysis + Microscopic Examination (04.29.24 @ 12:49)   pH Urine: 5.5  Urine Appearance: Cloudy  Color: Dark Yellow  Specific Gravity: 1.013  Protein, Urine: 30 mg/dL  Glucose Qualitative, Urine: Negative mg/dL  Ketone - Urine: Negative mg/dL  Blood, Urine: Large  Bilirubin: Negative  Urobilinogen: 1.0 mg/dL  Leukocyte Esterase Concentration: Large  Nitrite: Positive  Review: Reviewed  White Blood Cell - Urine: 194 /HPF  Red Blood Cell - Urine: 318 /HPF  Bacteria: Negative /HPF  Cast: 1 /LPF  Epithelial Cells: 0 /HPF    2/20/2024 Surgical Pathology:  Specimen(s) Submitted   1 Morcellated prostate   2 Bladder stone   Final Diagnosis   1. Prostate chips, TUR   -Benign prostatic hyperplasia (weight: 76 grams).   -Benign urothelium with acute inflammation and reactive changes.   2. Calculus, bladder, removal:   -Calculus, gross diagnosis only. See note.   Note: The specimen is sent for chemical analysis, which will be reported separately.     MICROBIOLOGY:  Culture - Blood (collected 04-29-24 @ 16:20)  Source: .Blood Blood-Venous  Gram Stain (04-30-24 @ 11:59):    Growth in aerobic bottle: Gram Positive Cocci in Clusters  Preliminary Report (04-30-24 @ 12:00):    Growth in aerobic bottle: Gram Positive Cocci in Clusters    Direct identification is available within approximately 3-5    hours either by Blood Panel Multiplexed PCR or Direct    MALDI-TOF. Details: [email or web address]  Organism: Blood Culture PCR (04-30-24 @ 13:53)  Organism: Blood Culture PCR (04-30-24 @ 13:53)      Method Type: PCR      -  Coagulase negative Staphylococcus: Detec    RADIOLOGY:  US Kidney and Bladder (04.29.24 @ 19:34)  IMPRESSION:  Single left kidney containing multiple stones as described. Partial urinary retention as described    VA Duplex Lower Ext Vein Scan, Bilat (04.29.24 @ 19:15)  IMPRESSION:  No evidence of deep venous thrombosis in either lower extremity.    Xray Chest 1 View- PORTABLE-Urgent (04.29.24 @ 14:37)  IMPRESSION:  No focal consolidations.    US Testicles (04.04.24 @ 15:33)  IMPRESSION:  Left-sided epididymitis.     Patient is a 64y old  Male who presents with a chief complaint of near syncope     HPI:  64M with Afib, HTN, HLD, Hx of UTIs with UCx growing Enterobacter Cloacae and Pseudomonas, epididymitis in 4/2024 and hematuria, s/p cystoscopy, laser enucleation of prostate with morcellation with Dr. Pema Navarro on 2/20/2024. presents with near syncope at doctor's office. Patient was at his neurologist's office. While sitting on the exam table, suddenly he felt the room closing on him, tunnel vision, and vision sight darkening, and lightheadedness. He denies any loss of consciousness. Of note he was diagnosed with UTI and finished antibiotics 1 week ago with ciprofloxacin. Upon work up patient afebrile with Tmax 100.2 (rectal). Labs showing WBC 8.29 now 7.17, ProBNP 817, and UA showing positive nitrite, large leukocyte esterase, 194 WBC and negative bacteria with 318 RBC. Imaging obtained is a CXR which was negative, US of the b/l LE negative for DVT and TTE negative for acute findings. Now blood culture growing 1/2 bottles of CoNS. Currently patient denies having any further syncopal symptoms. States that he has no urinary complaints without burning, retention or need for catheters. He admits to still having kidney stones. Lastly, denies fevers, chills, chest pain or pain at any IV sights. He admits to having a cough and congestion.     prior hospital charts reviewed [  ]  primary team notes reviewed [  ]  other consultant notes reviewed [  ]    PAST MEDICAL & SURGICAL HISTORY:  HTN  Hyperlipidemia  BPH with urinary obstruction  Hx Cystoscopy, laser enucleation of prostate with morcellation  History of urinary retention    Allergies  No Known Allergies    ANTIMICROBIALS:  cefTRIAXone   IVPB 1000 every 24 hours (4/29-)    MEDICATIONS  (STANDING):  acetaminophen     Tablet .. 650 every 6 hours PRN  apixaban 5 every 12 hours  atorvastatin 10 at bedtime  melatonin 3 at bedtime PRN  nadolol 20 daily    SOCIAL HISTORY:     Lives alone, works as a  of a [employer], denies EtOH/smoking/recreational drugs, denies travel within 10 years, no pets, no sick contacts, not currently sexually active    FAMILY HISTORY:  Mother: CVA  Father: Heart disease     ROS:  Constitutional: No fevers, No chills, No weight loss, No fatigue   Skin: No rash, no phlebitis	  Eyes: No discharge, No change in vision	  ENMT: No sore throat, No ulcers  Respiratory: Positive congestion and cough, no SOB  Cardiovascular:  No chest pain, No palpitations   Gastrointestinal: No pain, No nausea, No vomiting, No diarrhea, No constipation	  Genitourinary: No dysuria, No frequency, No hesitancy, No flank pain  MSK: No Joint pain, No back pain, No edema  Neurological: No HA, no weakness, no seizures, no AMS     Vital Signs Last 24 Hrs  T(F): 98.4 (04-30-24 @ 10:30), Max: 100.2 (04-29-24 @ 13:14)  Vital Signs Last 24 Hrs  HR: 78 (04-30-24 @ 10:30) (78 - 94)  BP: 178/86 (04-30-24 @ 10:30) (161/70 - 178/86)  RR: 18 (04-30-24 @ 10:30)  SpO2: 98% (04-30-24 @ 10:30) (97% - 100%)  Wt(kg): --    PHYSICAL EXAM:  General: Patient appears comfortable, no acute distress  HEENT: NCAT, PERRL, anicteric sclera, mucous membranes moist and intact  Neck: Supple, No lymphadenopathy  CV: +S1/S2, RRR, no M/R/G  Lungs: No respiratory distress, CTA b/l, no wheezing, rales or rhonchi  Abd:  BS4+, Soft, NTND, no guarding  : No suprapubic tenderness, no CVA tenderness, no erythema of scrotum   Neuro: AAOx3. No focal deficits noted.   Ext: No cyanosis, no edema  Msk: freely moving upper and lower extremities  Skin: No rash, no phlebitis, No erythema, darkening of skin present b/l LE                          12.7   7.17  )-----------( 191      ( 30 Apr 2024 06:07 )             40.6     137  |  102  |  20  ----------------------------<  87  4.1   |  23  |  1.01    Ca    8.7      30 Apr 2024 06:07  Mg     2.0     04-30    TPro  7.0  /  Alb  3.9  /  TBili  1.1  /  DBili  x   /  AST  12  /  ALT  15  /  AlkPhos  46  04-29    Urinalysis + Microscopic Examination (04.29.24 @ 12:49)   pH Urine: 5.5  Urine Appearance: Cloudy  Color: Dark Yellow  Specific Gravity: 1.013  Protein, Urine: 30 mg/dL  Glucose Qualitative, Urine: Negative mg/dL  Ketone - Urine: Negative mg/dL  Blood, Urine: Large  Bilirubin: Negative  Urobilinogen: 1.0 mg/dL  Leukocyte Esterase Concentration: Large  Nitrite: Positive  Review: Reviewed  White Blood Cell - Urine: 194 /HPF  Red Blood Cell - Urine: 318 /HPF  Bacteria: Negative /HPF  Cast: 1 /LPF  Epithelial Cells: 0 /HPF    2/20/2024 Surgical Pathology:  Specimen(s) Submitted   1 Morcellated prostate   2 Bladder stone   Final Diagnosis   1. Prostate chips, TUR   -Benign prostatic hyperplasia (weight: 76 grams).   -Benign urothelium with acute inflammation and reactive changes.   2. Calculus, bladder, removal:   -Calculus, gross diagnosis only. See note.   Note: The specimen is sent for chemical analysis, which will be reported separately.     MICROBIOLOGY:  Culture - Blood (collected 04-29-24 @ 16:20)  Source: .Blood Blood-Venous  Gram Stain (04-30-24 @ 11:59):    Growth in aerobic bottle: Gram Positive Cocci in Clusters  Preliminary Report (04-30-24 @ 12:00):    Growth in aerobic bottle: Gram Positive Cocci in Clusters    Direct identification is available within approximately 3-5    hours either by Blood Panel Multiplexed PCR or Direct    MALDI-TOF. Details: [email or web address]  Organism: Blood Culture PCR (04-30-24 @ 13:53)  Organism: Blood Culture PCR (04-30-24 @ 13:53)      Method Type: PCR      -  Coagulase negative Staphylococcus: Detec    RADIOLOGY:  US Kidney and Bladder (04.29.24 @ 19:34)  IMPRESSION:  Single left kidney containing multiple stones as described. Partial urinary retention as described    VA Duplex Lower Ext Vein Scan, Bilat (04.29.24 @ 19:15)  IMPRESSION:  No evidence of deep venous thrombosis in either lower extremity.    Xray Chest 1 View- PORTABLE-Urgent (04.29.24 @ 14:37)  IMPRESSION:  No focal consolidations.    US Testicles (04.04.24 @ 15:33)  IMPRESSION:  Left-sided epididymitis.     Patient is a 64y old  Male who presents with a chief complaint of near syncope     HPI:  64M with Afib, HTN, HLD, Hx of UTIs with UCx growing Enterobacter Cloacae and Pseudomonas, epididymitis in 4/2024 and hematuria, s/p cystoscopy, laser enucleation of prostate with morcellation with Dr. Pema Navarro on 2/20/2024. presents with near syncope at doctor's office. Patient was at his neurologist's office. While sitting on the exam table, suddenly he felt the room closing on him, tunnel vision, and vision sight darkening, and lightheadedness. He denies any loss of consciousness. Of note he was diagnosed with UTI and finished antibiotics 1 week ago with ciprofloxacin. Upon work up patient afebrile with Tmax 100.2 (rectal). Labs showing WBC 8.29 now 7.17, ProBNP 817, and UA showing positive nitrite, large leukocyte esterase, 194 WBC and negative bacteria with 318 RBC. Imaging obtained is a CXR which was negative, US of the b/l LE negative for DVT and TTE negative for acute findings. Now blood culture growing 1/2 bottles of CoNS. Currently patient denies having any further syncopal symptoms. States that he has no urinary complaints without burning, retention or need for catheters. He admits to still having kidney stones. Lastly, denies fevers, chills, chest pain or pain at any IV sights. He admits to having a cough and congestion.     prior hospital charts reviewed [  ]  primary team notes reviewed [  ]  other consultant notes reviewed [  ]    PAST MEDICAL & SURGICAL HISTORY:  HTN  Hyperlipidemia  BPH with urinary obstruction  Hx Cystoscopy, laser enucleation of prostate with morcellation  History of urinary retention    Allergies  No Known Allergies    ANTIMICROBIALS:  cefTRIAXone   IVPB 1000 every 24 hours (4/29-)    MEDICATIONS  (STANDING):  acetaminophen     Tablet .. 650 every 6 hours PRN  apixaban 5 every 12 hours  atorvastatin 10 at bedtime  melatonin 3 at bedtime PRN  nadolol 20 daily    SOCIAL HISTORY:     Lives alone, works as a  of a [employer], denies EtOH/smoking/recreational drugs, denies travel within 10 years, no pets, no sick contacts, not currently sexually active    FAMILY HISTORY:  Mother: CVA  Father: Heart disease     ROS:  Constitutional: No fevers, No chills, No weight loss, No fatigue   Skin: No rash, no phlebitis	  Eyes: No discharge, No change in vision	  ENMT: No sore throat, No ulcers  Respiratory: Positive congestion and cough, no SOB  Cardiovascular:  No chest pain, No palpitations   Gastrointestinal: No pain, No nausea, No vomiting, No diarrhea, No constipation	  Genitourinary: No dysuria, No frequency, No hesitancy, No flank pain  MSK: No Joint pain, No back pain, No edema  Neurological: No HA, no weakness, no seizures, no AMS     Vital Signs Last 24 Hrs  T(F): 98.4 (04-30-24 @ 10:30), Max: 100.2 (04-29-24 @ 13:14)  Vital Signs Last 24 Hrs  HR: 78 (04-30-24 @ 10:30) (78 - 94)  BP: 178/86 (04-30-24 @ 10:30) (161/70 - 178/86)  RR: 18 (04-30-24 @ 10:30)  SpO2: 98% (04-30-24 @ 10:30) (97% - 100%)  Wt(kg): --    PHYSICAL EXAM:  Constitutional: non-toxic, no distress  HEAD/EYES: anicteric  ENT:  nasal congestion present, no LAD  Cardiovascular:   normal S1, S2  Respiratory:  clear BS bilaterally  GI:  soft, non-tender, normal bowel sounds  :  no jiménez, no CVA tenderness  Musculoskeletal:  no synovitis  Neurologic: awake and alert, normal strength, no focal findings  Skin:  no rash   Psychiatric:  awake, alert, appropriate mood                        12.7   7.17  )-----------( 191      ( 30 Apr 2024 06:07 )             40.6     137  |  102  |  20  ----------------------------<  87  4.1   |  23  |  1.01    Ca    8.7      30 Apr 2024 06:07  Mg     2.0     04-30    TPro  7.0  /  Alb  3.9  /  TBili  1.1  /  DBili  x   /  AST  12  /  ALT  15  /  AlkPhos  46  04-29    Urinalysis + Microscopic Examination (04.29.24 @ 12:49)   pH Urine: 5.5  Urine Appearance: Cloudy  Color: Dark Yellow  Specific Gravity: 1.013  Protein, Urine: 30 mg/dL  Glucose Qualitative, Urine: Negative mg/dL  Ketone - Urine: Negative mg/dL  Blood, Urine: Large  Bilirubin: Negative  Urobilinogen: 1.0 mg/dL  Leukocyte Esterase Concentration: Large  Nitrite: Positive  Review: Reviewed  White Blood Cell - Urine: 194 /HPF  Red Blood Cell - Urine: 318 /HPF  Bacteria: Negative /HPF  Cast: 1 /LPF  Epithelial Cells: 0 /HPF    2/20/2024 Surgical Pathology:  Specimen(s) Submitted   1 Morcellated prostate   2 Bladder stone   Final Diagnosis   1. Prostate chips, TUR   -Benign prostatic hyperplasia (weight: 76 grams).   -Benign urothelium with acute inflammation and reactive changes.   2. Calculus, bladder, removal:   -Calculus, gross diagnosis only. See note.   Note: The specimen is sent for chemical analysis, which will be reported separately.     MICROBIOLOGY:  Culture - Blood (collected 04-29-24 @ 16:20)  Source: .Blood Blood-Venous  Gram Stain (04-30-24 @ 11:59):    Growth in aerobic bottle: Gram Positive Cocci in Clusters  Preliminary Report (04-30-24 @ 12:00):    Growth in aerobic bottle: Gram Positive Cocci in Clusters    Direct identification is available within approximately 3-5    hours either by Blood Panel Multiplexed PCR or Direct    MALDI-TOF. Details: [email or web address]  Organism: Blood Culture PCR (04-30-24 @ 13:53)  Organism: Blood Culture PCR (04-30-24 @ 13:53)      Method Type: PCR      -  Coagulase negative Staphylococcus: Detec    RADIOLOGY:  US Kidney and Bladder (04.29.24 @ 19:34)  IMPRESSION:  Single left kidney containing multiple stones as described. Partial urinary retention as described    VA Duplex Lower Ext Vein Scan, Bilat (04.29.24 @ 19:15)  IMPRESSION:  No evidence of deep venous thrombosis in either lower extremity.    Xray Chest 1 View- PORTABLE-Urgent (04.29.24 @ 14:37)  IMPRESSION:  No focal consolidations.    US Testicles (04.04.24 @ 15:33)  IMPRESSION:  Left-sided epididymitis.

## 2024-04-30 NOTE — CONSULT NOTE ADULT - SUBJECTIVE AND OBJECTIVE BOX
CHIEF COMPLAINT:Patient is a 64y old  Male who presents with a chief complaint of near syncope (29 Apr 2024 16:21)      HISTORY OF PRESENT ILLNESS:    64 male with history of HTN, HLD, PAF on a/c,   admitted with near syncope while sitting in doctors office yesterday   no cp   no sob   no palpitation     PAST MEDICAL & SURGICAL HISTORY:  Hyperlipidemia      BPH with urinary obstruction      History of urinary retention      Beckwith catheter in place      No significant past surgical history              MEDICATIONS:  apixaban 5 milliGRAM(s) Oral every 12 hours  metoprolol succinate ER 25 milliGRAM(s) Oral daily    cefTRIAXone   IVPB 1000 milliGRAM(s) IV Intermittent every 24 hours      acetaminophen     Tablet .. 650 milliGRAM(s) Oral every 6 hours PRN  melatonin 3 milliGRAM(s) Oral at bedtime PRN      atorvastatin 10 milliGRAM(s) Oral at bedtime        FAMILY HISTORY:      Non-contributory    SOCIAL HISTORY:    No tobacco, drugs or etoh    Allergies    No Known Allergies    Intolerances    	    REVIEW OF SYSTEMS:  as above  The rest of the 14 points ROS reviewed and except above they are unremarkable.        PHYSICAL EXAM:  T(C): 36.9 (04-30-24 @ 04:08), Max: 37.9 (04-29-24 @ 13:14)  HR: 88 (04-30-24 @ 04:08) (75 - 94)  BP: 164/83 (04-30-24 @ 04:08) (156/84 - 169/88)  RR: 18 (04-30-24 @ 04:08) (17 - 19)  SpO2: 98% (04-30-24 @ 04:08) (97% - 100%)  Wt(kg): --  I&O's Summary    JVP: Normal  Neck: supple  Lung: clear   CV: S1 S2 , Murmur:  Abd: soft  Ext: No edema  neuro: Awake / alert  Psych: flat affect  Skin: normal      LABS/DATA:    TELEMETRY: 	  sinus, afib   ECG:  	   	  CARDIAC MARKERS:                        34 <<== 04-29-24 @ 12:43                              12.7   7.17  )-----------( 191      ( 30 Apr 2024 06:07 )             40.6     04-30    137  |  102  |  20  ----------------------------<  87  4.1   |  23  |  1.01    Ca    8.7      30 Apr 2024 06:07  Mg     2.0     04-30    TPro  7.0  /  Alb  3.9  /  TBili  1.1  /  DBili  x   /  AST  12  /  ALT  15  /  AlkPhos  46  04-29    proBNP:   Lipid Profile:   HgA1c:   TSH:

## 2024-05-01 ENCOUNTER — RESULT REVIEW (OUTPATIENT)
Age: 64
End: 2024-05-01

## 2024-05-01 PROBLEM — R42 POSTURAL DIZZINESS WITH PRESYNCOPE: Status: ACTIVE | Noted: 2024-05-01

## 2024-05-01 PROBLEM — G95.9 CERVICAL MYELOPATHY WITH CERVICAL RADICULOPATHY: Status: ACTIVE | Noted: 2024-05-01

## 2024-05-01 PROBLEM — G25.3 MYOCLONIC JERKING: Status: ACTIVE | Noted: 2024-05-01

## 2024-05-01 PROBLEM — R26.0 ATAXIA INVOLVING LEGS: Status: ACTIVE | Noted: 2024-05-01

## 2024-05-01 LAB
CULTURE RESULTS: ABNORMAL
CULTURE RESULTS: ABNORMAL
ORGANISM # SPEC MICROSCOPIC CNT: ABNORMAL
ORGANISM # SPEC MICROSCOPIC CNT: ABNORMAL
PROCALCITONIN SERPL-MCNC: 0.14 NG/ML — HIGH (ref 0.02–0.1)
SPECIMEN SOURCE: SIGNIFICANT CHANGE UP
SPECIMEN SOURCE: SIGNIFICANT CHANGE UP

## 2024-05-01 PROCEDURE — 93016 CV STRESS TEST SUPVJ ONLY: CPT

## 2024-05-01 PROCEDURE — 78452 HT MUSCLE IMAGE SPECT MULT: CPT | Mod: 26

## 2024-05-01 PROCEDURE — 99232 SBSQ HOSP IP/OBS MODERATE 35: CPT

## 2024-05-01 PROCEDURE — 93018 CV STRESS TEST I&R ONLY: CPT

## 2024-05-01 RX ADMIN — APIXABAN 5 MILLIGRAM(S): 2.5 TABLET, FILM COATED ORAL at 17:10

## 2024-05-01 RX ADMIN — ATORVASTATIN CALCIUM 10 MILLIGRAM(S): 80 TABLET, FILM COATED ORAL at 21:29

## 2024-05-01 RX ADMIN — NADOLOL 20 MILLIGRAM(S): 80 TABLET ORAL at 06:01

## 2024-05-01 RX ADMIN — APIXABAN 5 MILLIGRAM(S): 2.5 TABLET, FILM COATED ORAL at 06:01

## 2024-05-01 RX ADMIN — CHLORHEXIDINE GLUCONATE 1 APPLICATION(S): 213 SOLUTION TOPICAL at 11:04

## 2024-05-01 NOTE — HISTORY OF PRESENT ILLNESS
[FreeTextEntry1] : Mr. Prakash is a 65-year-old gentleman referred by Dr. Lara and Dr. Mederos is stated that she was brought in by Ana Valentino Who was present during this evaluation and identified herself as her spouse.  Mr. Prakash is stated that he has problems with movements since last 2 years which was relatively acute in onset with a stiffness in the legs and has been using a cane and feels that the right upper extremity is weak with stiffness since last 1 year and gets myoclonic jerking movements in the lower extremities since last 6 months which occurs once in 2 to 3 weeks.  He stated that he has not been seen by a neurologist or other physicians with regards to his neurological symptoms ongoing for approximately 2 years.  Review of systems with respect to his neurological symptoms was otherwise unremarkable and he denied any headache diplopia or dysarthria or dysphagia or dyspnea has no lower extremity weakness but only stiff feeling no tingling or numbness in the feet or hands and denied any significant cognitive language or memory dysfunction.  His past medical history is pertinent for cardiac arrhythmia including palpitation and has been on anticoagulants and reported that he is electrocardiograms had revealed cardiac arrhythmia including history of hypertension, aortic arteriosclerotic disease, bundle branch block and possible aneurysm and is currently on Eliquis 5 mg oral anticoagulant, metoprolol 25 mg and atorvastatin 10 mg.  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  There is strong family history of cardiac disease in his father  of coronary artery disease and mother  of stroke and has 1 brother who is reportedly well.  I reviewed his allergies and medications.

## 2024-05-01 NOTE — REVIEW OF SYSTEMS
[Arm Weakness] : arm weakness [Leg Weakness] : leg weakness [Numbness] : numbness [Difficulty Walking] : difficulty walking [Limping] : limping [Ataxia] : ataxia [As noted in HPI] : as noted in HPI [As Noted in HPI] : as noted in HPI [Negative] : Heme/Lymph

## 2024-05-01 NOTE — DATA REVIEWED
[de-identified] : CT scan of the abdomen and pelvis revealed a large calculus burden in the lower pole of the left renal collecting system and reconfirmed splenic artery aneurysm.  I also reviewed the transesophageal echocardiogram undertaken on 4/30/2024 after I had sent him to the emergency room and it showed smaller left ventricular size with normal ejection fraction and no significant valvular disease or pericardial effusion. [de-identified] : I reviewed the electronic medical records with history of splenic artery aneurysm prostatic hypertrophy without obstruction, cardiac arrhythmia hydronephrosis, hyperlipidemia bilateral knee pain and nephrolithiasis, prediabetes and vitamin D deficiency.  There are several urological and surgical consultations but no neurological evaluation.  His hemoglobin A1c is 5.6 and PSA levels were elevated to 4.39 mcg/mL.

## 2024-05-01 NOTE — PROVIDER CONTACT NOTE (OTHER) - BACKGROUND
Pt admitted for failure to thrive with hx of UTI, AMS, PAF. Previous RRT called on 4/30 for unresponsiveness.

## 2024-05-01 NOTE — PROGRESS NOTE ADULT - SUBJECTIVE AND OBJECTIVE BOX
Subjective: Patient seen and examined. No new events except as noted.     SUBJECTIVE/ROS:  feels ok       MEDICATIONS:  MEDICATIONS  (STANDING):  apixaban 5 milliGRAM(s) Oral every 12 hours  atorvastatin 10 milliGRAM(s) Oral at bedtime  chlorhexidine 2% Cloths 1 Application(s) Topical daily  nadolol 20 milliGRAM(s) Oral daily      PHYSICAL EXAM:  T(C): 36.8 (05-01-24 @ 05:02), Max: 37 (04-30-24 @ 21:10)  HR: 73 (05-01-24 @ 05:02) (73 - 80)  BP: 166/76 (05-01-24 @ 05:02) (123/85 - 178/86)  RR: 18 (05-01-24 @ 05:02) (18 - 18)  SpO2: 97% (05-01-24 @ 05:02) (97% - 98%)  Wt(kg): --  I&O's Summary    30 Apr 2024 07:01  -  01 May 2024 07:00  --------------------------------------------------------  IN: 720 mL / OUT: 950 mL / NET: -230 mL            JVP: Normal  Neck: supple  Lung: clear   CV: S1 S2 , Murmur:  Abd: soft  Ext: No edema  neuro: Awake / alert  Psych: flat affect  Skin: normal``    LABS/DATA:    CARDIAC MARKERS:                                12.7   7.17  )-----------( 191      ( 30 Apr 2024 06:07 )             40.6     04-30    137  |  102  |  20  ----------------------------<  87  4.1   |  23  |  1.01    Ca    8.7      30 Apr 2024 06:07  Mg     2.0     04-30    TPro  7.0  /  Alb  3.9  /  TBili  1.1  /  DBili  x   /  AST  12  /  ALT  15  /  AlkPhos  46  04-29    proBNP:   Lipid Profile:   HgA1c:   TSH:     TELE:  EKG:    < from: TTE W or WO Ultrasound Enhancing Agent (04.30.24 @ 11:10) >  CONCLUSIONS:      1. Left ventricular cavity is normal in size. Left ventricularwall thickness is normal. Left ventricular systolic function is normal with an ejection fraction of 55 % by Loza's method of disks. There are no regional wall motion abnormalities seen.   2. Normal left ventricular diastolic function, with normal filling pressure.   3. Normal right ventricular cavity size, with normal wall thickness, and normal systolic function.   4. Normal left and right atrial size.   5. No significant valvular disease.   6. No pericardial effusion seen.   7. No prior echocardiogram is available for comparison.    < end of copied text >

## 2024-05-01 NOTE — PROGRESS NOTE ADULT - SUBJECTIVE AND OBJECTIVE BOX
Patient is a 64y old  Male who presents with a chief complaint of near syncope (01 May 2024 08:57)    Date of servie : 05-01-24 @ 18:53  INTERVAL HPI/OVERNIGHT EVENTS:  T(C): 36.6 (05-01-24 @ 14:58), Max: 37 (04-30-24 @ 21:10)  HR: 73 (05-01-24 @ 14:58) (73 - 80)  BP: 146/70 (05-01-24 @ 14:58) (123/85 - 166/76)  RR: 18 (05-01-24 @ 14:58) (18 - 18)  SpO2: 96% (05-01-24 @ 14:58) (96% - 98%)  Wt(kg): --  I&O's Summary    30 Apr 2024 07:01  -  01 May 2024 07:00  --------------------------------------------------------  IN: 720 mL / OUT: 950 mL / NET: -230 mL    01 May 2024 07:01  -  01 May 2024 18:53  --------------------------------------------------------  IN: 660 mL / OUT: 0 mL / NET: 660 mL        LABS:                        12.7   7.17  )-----------( 191      ( 30 Apr 2024 06:07 )             40.6     04-30    137  |  102  |  20  ----------------------------<  87  4.1   |  23  |  1.01    Ca    8.7      30 Apr 2024 06:07  Mg     2.0     04-30        Urinalysis Basic - ( 30 Apr 2024 06:07 )    Color: x / Appearance: x / SG: x / pH: x  Gluc: 87 mg/dL / Ketone: x  / Bili: x / Urobili: x   Blood: x / Protein: x / Nitrite: x   Leuk Esterase: x / RBC: x / WBC x   Sq Epi: x / Non Sq Epi: x / Bacteria: x      CAPILLARY BLOOD GLUCOSE            Urinalysis Basic - ( 30 Apr 2024 06:07 )    Color: x / Appearance: x / SG: x / pH: x  Gluc: 87 mg/dL / Ketone: x  / Bili: x / Urobili: x   Blood: x / Protein: x / Nitrite: x   Leuk Esterase: x / RBC: x / WBC x   Sq Epi: x / Non Sq Epi: x / Bacteria: x        MEDICATIONS  (STANDING):  apixaban 5 milliGRAM(s) Oral every 12 hours  atorvastatin 10 milliGRAM(s) Oral at bedtime  chlorhexidine 2% Cloths 1 Application(s) Topical daily  nadolol 20 milliGRAM(s) Oral daily    MEDICATIONS  (PRN):  acetaminophen     Tablet .. 650 milliGRAM(s) Oral every 6 hours PRN Temp greater or equal to 38C (100.4F), Mild Pain (1 - 3)  melatonin 3 milliGRAM(s) Oral at bedtime PRN Insomnia          PHYSICAL EXAM:  GENERAL: NAD, well-groomed, well-developed  HEAD:  Atraumatic, Normocephalic  CHEST/LUNG: Clear to percussion bilaterally; No rales, rhonchi, wheezing, or rubs  HEART: Regular rate and rhythm; No murmurs, rubs, or gallops  ABDOMEN: Soft, Nontender, Nondistended; Bowel sounds present  EXTREMITIES:  2+ Peripheral Pulses, No clubbing, cyanosis, or edema  LYMPH: No lymphadenopathy noted  SKIN: No rashes or lesions    Care Discussed with Consultants/Other Providers [ ] YES  [ ] NO

## 2024-05-01 NOTE — PHYSICAL EXAM
[FreeTextEntry1] : Vital signs were recorded and unremarkable at the initial part of my examination.  There is no carotid bruit thyromegaly or lymphadenopathy.  Chest is clear and heart sounds are normal.  Abdomen is soft without bladder distention.  Pedal pulsations are present but he has significant edema in the legs and he stated that his swollen legs are ongoing for few years.  There are no meningeal signs.  Neurological examination revealed normal memory language cognitive and behavioral functions.  Optic discs were normal visual fields were full pupils were brisk and extraocular movements were full without nystagmus and there was no facial weakness hearing and bulbar functions were intact.  Motor evaluation revealed 4/5 strength in the right upper extremity without spasticity or hypotonia and left upper extremity was unremarkable.  Lower extremity evaluation revealed diffuse +4/5 strength without any focal motor weakness and the tone was slightly increased or normal.  His finger-to-nose testing was normal heel-to-shin testing was slow and difficult but there was no clumsiness.  Deep tendon reflexes were 2++ in the right upper extremity and 2+ in the left upper extremity and in the lower extremity knee reflexes were 2+ and ankles were trace to absent but there was no Babinski sign.  Heel-to-shin testing was slow and sensory evaluation revealed blunted vibration sense in the feet but posterior column sensation appeared normal.  Further examination was impeded as he had a presyncopal episode.  Around 11:20 AM during the evaluation while he was sitting on the examining table he became pale diaphoretic and had a near fainting spell was put in supine position and I did not find any pulse for few seconds and when it resumed there was irregular pulse in volume as well as rhythm and appears to have atrial fibrillation finding and during this.  His pulse remains slow and friable and blood pressure was somewhat low but resumed back to 124/60 and I immediately called 911 and an ambulance arrived and by this time around 11:40 AM I noted at least 3 episodes of irregular pulse and the ambulance took him to [facility] for immediate cardiac evaluation.  I did not get any phone calls from the emergency room.  I however reviewed the echocardiogram report which was available in the medical records.

## 2024-05-01 NOTE — DISCUSSION/SUMMARY
[FreeTextEntry1] : Opinion-the patient's history is consistent with cervical radiculomyelopathy with subtle findings and because of the acute cardiac arrhythmia during evaluation I transported him immediately to [facility] emergency room with an ambulance team and so far I have not heard anything from the emergency room but I will contact the patient as he would require further investigation with his current complaints with cervical spine MRI as I suspect that he has cervical myelopathy with radiculopathy of the right upper extremity.  I had a long conversation with the patient and his spouse prior to transfer him to the emergency room and they expressed their understanding about the urgency.  I will contact the family and find out the final outcome of his emergency room visit.

## 2024-05-01 NOTE — PROGRESS NOTE ADULT - SUBJECTIVE AND OBJECTIVE BOX
Patient is a 64y old  Male who presents with a chief complaint of near syncope     f/u fever    Interval History/ROS:  no fever.  RVP (+) entero/rhino.    PAST MEDICAL & SURGICAL HISTORY:  HTN  Hyperlipidemia  BPH with urinary obstruction  Hx Cystoscopy, laser enucleation of prostate with morcellation  History of urinary retention    Allergies  No Known Allergies    ANTIMICROBIALS:  cefTRIAXone   IVPB 1000 every 24 hours (4/29 x1)    MEDICATIONS  (STANDING):  apixaban 5 every 12 hours  atorvastatin 10 at bedtime  nadolol 20 daily    Vital Signs Last 24 Hrs  T(F): 98.2 (05-01-24 @ 05:02), Max: 98.6 (04-30-24 @ 21:10)  HR: 73 (05-01-24 @ 05:02)  BP: 166/76 (05-01-24 @ 05:02)  RR: 18 (05-01-24 @ 05:02)  SpO2: 97% (05-01-24 @ 05:02) (97% - 98%)  Wt(kg): --    PHYSICAL EXAM:                            12.7   7.17  )-----------( 191      ( 30 Apr 2024 06:07 )             40.6 04-30    137  |  102  |  20  ----------------------------<  87  4.1   |  23  |  1.01  Ca    8.7      30 Apr 2024 06:07Mg     2.0     04-30  TPro  7.0  /  Alb  3.9  /  TBili  1.1  /  DBili  x   /  AST  12  /  ALT  15  /  AlkPhos  46  04-29    Troponin T, High Sensitivity Result: 25 (04-30)  Troponin T, High Sensitivity Result: 34 (04-29)    Urinalysis + Microscopic Examination (04.29.24 @ 12:49)   pH Urine: 5.5  Urine Appearance: Cloudy  Color: Dark Yellow  Specific Gravity: 1.013  Protein, Urine: 30 mg/dL  Glucose Qualitative, Urine: Negative mg/dL  Ketone - Urine: Negative mg/dL  Blood, Urine: Large  Bilirubin: Negative  Urobilinogen: 1.0 mg/dL  Leukocyte Esterase Concentration: Large  Nitrite: Positive  Review: Reviewed  White Blood Cell - Urine: 194 /HPF  Red Blood Cell - Urine: 318 /HPF  Bacteria: Negative /HPF  Cast: 1 /LPF  Epithelial Cells: 0 /HPF    2/20/2024 Surgical Pathology:  Specimen(s) Submitted   1 Morcellated prostate   2 Bladder stone   Final Diagnosis   1. Prostate chips, TUR   -Benign prostatic hyperplasia (weight: 76 grams).   -Benign urothelium with acute inflammation and reactive changes.   2. Calculus, bladder, removal:   -Calculus, gross diagnosis only. See note.   Note: The specimen is sent for chemical analysis, which will be reported separately.     MICROBIOLOGY:  4/30 BC pending  Entero/Rhinovirus (RapRVP): Detected (04.29.24 @ 16:19)   Culture - Blood (collected 04-29-24 @ 16:20)  Source: .Blood Blood-Peripheral  Gram Stain (04-30-24 @ 17:16):    Growth in aerobic bottle: Gram Positive Cocci in Clusters  Preliminary Report (04-30-24 @ 17:16):    Growth in aerobic bottle: Gram Positive Cocci in Clusters    Culture - Blood (collected 04-29-24 @ 16:20)  Source: .Blood Blood-Venous  Gram Stain (04-30-24 @ 11:59):    Growth in aerobic bottle: Gram Positive Cocci in Clusters  Preliminary Report (04-30-24 @ 12:00):    Growth in aerobic bottle: Gram Positive Cocci in Clusters    Direct identification is available within approximately 3-5    hours either by Blood Panel Multiplexed PCR or Direct    MALDI-TOF. Details: [email or web address]  Organism: Blood Culture PCR (04-30-24 @ 13:53)  Organism: Blood Culture PCR (04-30-24 @ 13:53)      Method Type: PCR      -  Coagulase negative Staphylococcus: Detec    Culture - Urine (collected 04-29-24 @ 13:01)  Source: Clean Catch Clean Catch (Midstream)  Preliminary Report (04-30-24 @ 16:22):    >100,000 CFU/ml Gram Negative Rods    RADIOLOGY:  US Kidney and Bladder (04.29.24 @ 19:34)  IMPRESSION:  Single left kidney containing multiple stones as described. Partial urinary retention as described    VA Duplex Lower Ext Vein Scan, Bilat (04.29.24 @ 19:15)  IMPRESSION:  No evidence of deep venous thrombosis in either lower extremity.    Xray Chest 1 View- PORTABLE-Urgent (04.29.24 @ 14:37)  IMPRESSION:  No focal consolidations.    US Testicles (04.04.24 @ 15:33)  IMPRESSION:  Left-sided epididymitis.     Patient is a 64y old  Male who presents with a chief complaint of near syncope     f/u fever    Interval History/ROS:  no fever.  RVP (+) entero/rhino.    PAST MEDICAL & SURGICAL HISTORY:  HTN  Hyperlipidemia  BPH with urinary obstruction  Hx Cystoscopy, laser enucleation of prostate with morcellation  History of urinary retention    Allergies  No Known Allergies    ANTIMICROBIALS:  cefTRIAXone   IVPB 1000 every 24 hours (4/29 x1)    MEDICATIONS  (STANDING):  apixaban 5 every 12 hours  atorvastatin 10 at bedtime  nadolol 20 daily    Vital Signs Last 24 Hrs  T(F): 98.2 (05-01-24 @ 05:02), Max: 98.6 (04-30-24 @ 21:10)  HR: 73 (05-01-24 @ 05:02)  BP: 166/76 (05-01-24 @ 05:02)  RR: 18 (05-01-24 @ 05:02)  SpO2: 97% (05-01-24 @ 05:02) (97% - 98%)  Wt(kg): --    PHYSICAL EXAM:  Constitutional: non-toxic, no distress  HEAD/EYES: anicteric  ENT:  nasal congestion present,  Cardiovascular:   normal S1, S2  Respiratory:  clear BS bilaterally  GI:  soft, non-tender, normal bowel sounds  :  no jiménez, no CVA tenderness  Musculoskeletal:  no synovitis  Neurologic: awake and alert, normal strength, no focal findings  Skin:  no rash   Psychiatric:  awake, alert, appropriate mood                        12.7   7.17  )-----------( 191      ( 30 Apr 2024 06:07 )             40.6 04-30    137  |  102  |  20  ----------------------------<  87  4.1   |  23  |  1.01  Ca    8.7      30 Apr 2024 06:07Mg     2.0     04-30  TPro  7.0  /  Alb  3.9  /  TBili  1.1  /  DBili  x   /  AST  12  /  ALT  15  /  AlkPhos  46  04-29    Troponin T, High Sensitivity Result: 25 (04-30)  Troponin T, High Sensitivity Result: 34 (04-29)    Urinalysis + Microscopic Examination (04.29.24 @ 12:49)   pH Urine: 5.5  Urine Appearance: Cloudy  Color: Dark Yellow  Specific Gravity: 1.013  Protein, Urine: 30 mg/dL  Glucose Qualitative, Urine: Negative mg/dL  Ketone - Urine: Negative mg/dL  Blood, Urine: Large  Bilirubin: Negative  Urobilinogen: 1.0 mg/dL  Leukocyte Esterase Concentration: Large  Nitrite: Positive  Review: Reviewed  White Blood Cell - Urine: 194 /HPF  Red Blood Cell - Urine: 318 /HPF  Bacteria: Negative /HPF  Cast: 1 /LPF  Epithelial Cells: 0 /HPF    2/20/2024 Surgical Pathology:  Specimen(s) Submitted   1 Morcellated prostate   2 Bladder stone   Final Diagnosis   1. Prostate chips, TUR   -Benign prostatic hyperplasia (weight: 76 grams).   -Benign urothelium with acute inflammation and reactive changes.   2. Calculus, bladder, removal:   -Calculus, gross diagnosis only. See note.   Note: The specimen is sent for chemical analysis, which will be reported separately.     MICROBIOLOGY:  4/30 BC pending  Entero/Rhinovirus (RapRVP): Detected (04.29.24 @ 16:19)   Culture - Blood (collected 04-29-24 @ 16:20)  Source: .Blood Blood-Peripheral  Gram Stain (04-30-24 @ 17:16):    Growth in aerobic bottle: Gram Positive Cocci in Clusters  Preliminary Report (04-30-24 @ 17:16):    Growth in aerobic bottle: Gram Positive Cocci in Clusters    Culture - Blood (collected 04-29-24 @ 16:20)  Source: .Blood Blood-Venous  Gram Stain (04-30-24 @ 11:59):    Growth in aerobic bottle: Gram Positive Cocci in Clusters  Preliminary Report (04-30-24 @ 12:00):    Growth in aerobic bottle: Gram Positive Cocci in Clusters    Direct identification is available within approximately 3-5    hours either by Blood Panel Multiplexed PCR or Direct    MALDI-TOF. Details: [email or web address]  Organism: Blood Culture PCR (04-30-24 @ 13:53)  Organism: Blood Culture PCR (04-30-24 @ 13:53)      Method Type: PCR      -  Coagulase negative Staphylococcus: Detec    Culture - Urine (collected 04-29-24 @ 13:01)  Source: Clean Catch Clean Catch (Midstream)  Preliminary Report (04-30-24 @ 16:22):    >100,000 CFU/ml Gram Negative Rods    RADIOLOGY:  US Kidney and Bladder (04.29.24 @ 19:34)  IMPRESSION:  Single left kidney containing multiple stones as described. Partial urinary retention as described    VA Duplex Lower Ext Vein Scan, Bilat (04.29.24 @ 19:15)  IMPRESSION:  No evidence of deep venous thrombosis in either lower extremity.    Xray Chest 1 View- PORTABLE-Urgent (04.29.24 @ 14:37)  IMPRESSION:  No focal consolidations.    US Testicles (04.04.24 @ 15:33)  IMPRESSION:  Left-sided epididymitis.

## 2024-05-02 LAB
-  AMIKACIN: SIGNIFICANT CHANGE UP
-  AZTREONAM: SIGNIFICANT CHANGE UP
-  CEFEPIME: SIGNIFICANT CHANGE UP
-  CEFTAZIDIME: SIGNIFICANT CHANGE UP
-  CIPROFLOXACIN: SIGNIFICANT CHANGE UP
-  IMIPENEM: SIGNIFICANT CHANGE UP
-  LEVOFLOXACIN: SIGNIFICANT CHANGE UP
-  MEROPENEM: SIGNIFICANT CHANGE UP
-  PIPERACILLIN/TAZOBACTAM: SIGNIFICANT CHANGE UP
CULTURE RESULTS: ABNORMAL
METHOD TYPE: SIGNIFICANT CHANGE UP
ORGANISM # SPEC MICROSCOPIC CNT: ABNORMAL
ORGANISM # SPEC MICROSCOPIC CNT: ABNORMAL
SPECIMEN SOURCE: SIGNIFICANT CHANGE UP

## 2024-05-02 PROCEDURE — 70544 MR ANGIOGRAPHY HEAD W/O DYE: CPT | Mod: 26,59

## 2024-05-02 PROCEDURE — 70549 MR ANGIOGRAPH NECK W/O&W/DYE: CPT | Mod: 26

## 2024-05-02 PROCEDURE — 70553 MRI BRAIN STEM W/O & W/DYE: CPT | Mod: 26

## 2024-05-02 RX ORDER — ASPIRIN/CALCIUM CARB/MAGNESIUM 324 MG
81 TABLET ORAL DAILY
Refills: 0 | Status: DISCONTINUED | OUTPATIENT
Start: 2024-05-02 | End: 2024-05-04

## 2024-05-02 RX ADMIN — ATORVASTATIN CALCIUM 10 MILLIGRAM(S): 80 TABLET, FILM COATED ORAL at 21:10

## 2024-05-02 RX ADMIN — Medication 81 MILLIGRAM(S): at 11:17

## 2024-05-02 RX ADMIN — NADOLOL 20 MILLIGRAM(S): 80 TABLET ORAL at 06:34

## 2024-05-02 RX ADMIN — CHLORHEXIDINE GLUCONATE 1 APPLICATION(S): 213 SOLUTION TOPICAL at 11:18

## 2024-05-02 RX ADMIN — APIXABAN 5 MILLIGRAM(S): 2.5 TABLET, FILM COATED ORAL at 06:34

## 2024-05-02 NOTE — PROGRESS NOTE ADULT - SUBJECTIVE AND OBJECTIVE BOX
Subjective: Patient seen and examined. No new events except as noted.     SUBJECTIVE/ROS:  nad      MEDICATIONS:  MEDICATIONS  (STANDING):  atorvastatin 10 milliGRAM(s) Oral at bedtime  chlorhexidine 2% Cloths 1 Application(s) Topical daily  nadolol 20 milliGRAM(s) Oral daily      PHYSICAL EXAM:  T(C): 36.9 (05-02-24 @ 04:24), Max: 36.9 (05-02-24 @ 04:24)  HR: 69 (05-02-24 @ 04:24) (69 - 73)  BP: 160/80 (05-02-24 @ 04:24) (146/70 - 166/80)  RR: 18 (05-02-24 @ 04:24) (18 - 18)  SpO2: 96% (05-02-24 @ 04:24) (96% - 97%)  Wt(kg): --  I&O's Summary    01 May 2024 07:01  -  02 May 2024 07:00  --------------------------------------------------------  IN: 660 mL / OUT: 0 mL / NET: 660 mL            JVP: Normal  Neck: supple  Lung: clear   CV: S1 S2 , Murmur:  Abd: soft  Ext: No edema  neuro: Awake / alert  Psych: flat affect  Skin: normal``    LABS/DATA:    CARDIAC MARKERS:                  proBNP:   Lipid Profile:   HgA1c:   TSH:     TELE:  EKG:      < from: TTE W or WO Ultrasound Enhancing Agent (04.30.24 @ 11:10) >  CONCLUSIONS:      1. Left ventricular cavity is normal in size. Left ventricularwall thickness is normal. Left ventricular systolic function is normal with an ejection fraction of 55 % by Loza's method of disks. There are no regional wall motion abnormalities seen.   2. Normal left ventricular diastolic function, with normal filling pressure.   3. Normal right ventricular cavity size, with normal wall thickness, and normal systolic function.   4. Normal left and right atrial size.   5. No significant valvular disease.   6. No pericardial effusion seen.   7. No prior echocardiogram is available for comparison.    < end of copied text >  < from: Nuclear Stress Test-Pharmacologic.. (05.01.24 @ 09:26) >  -----------------------------------------------Conclusions:   1. Myocardial Perfusion: Abnormal.   2. Perfusion Findings: There is a medium to large sized, moderate to severe defect in the mid to basal anteroseptal wall that is partially reversible consistent with infarct with mild to moderate tessie-infarct ischemia. There is a large-sized, severe defect in the inferoseptal wall that is fixed consistent with infarct.   3. Hypokinesis of the inferoseptal wall and basal anteroseptal wall.   4. Stress electrocardiogram: No significant ischemic ST segment changes beyond baseline abnormalities.   5. The post stress left ventricular EF is 56 %. The stress end diastolic volume is 127 ml and systolic volume is 56 ml.    < end of copied text >

## 2024-05-02 NOTE — CONSULT NOTE ADULT - ASSESSMENT
64 year old M w/  Afib, HTN, HLD, hematuria, s/p cystoscopy, laser enucleation of prostate with morcellation with Dr. Pema Navarro on 2/20/2024 presents with near syncope at doctor's office.           Patient was at his neurologist's office. Dr. Berry,  While sitting on the exam table, suddenly he felt the room closing on him, tunnel vision, and vision sight darkening, and lightheadedness and diaphoretic. His doctor lay him down on the exam table. He denies any loss of consciousness. He denies any chest pain/SOB, denies any heart palpitations prior to and after the near syncope episode. He said his doctor felt his pulses, and his pulses were going in and out x 3 times. He denies any recent fever, but has some runny nose, dry cough, and chills recently. no LOC, no incontienence no convulsions.            Of note he was diagnosed with UTI and finished antibiotics 1 week ago. Also he was found to have atrial fibrillation on March 25, 2024 and was prescribed eliquis,   MRI brain and MRA H/N neg for acute findigns   + orthostatics   NST abnormal   o/e AAOx3, MORRIS cane at baseline     Imprssion:   pre syncope/near syncope without LOC. possible vasovagal vs related to recent infection and hematuria.  also wtih + Afib     - eliquis held. now on asa and statin  - cardio following ; plan for cath  - + orthostatics.  trend   - outpatient EEG  - infectious workup., cx's sent.   - TTE  - telemetry  - PT/OT  - check FS, glucose control <180  - GI/DVT ppx  - Counseling on diet, exercise, and medication adherence was done  - Counseling on smoking cessation and alcohol consumption offered when appropriate.  - Pain assessed and judicious use of narcotics when appropriate was discussed.    - Stroke education given when appropriate.  - Importance of fall prevention discussed.   - Differential diagnosis and plan of care discussed with patient and/or family and primary team  - Thank you for allowing me to participate in the care of this patient. Call with questions.   - sees Dr. Berry outpatient   Jose J Osborne MD  Vascular Neurology  Office: [phone]

## 2024-05-02 NOTE — CONSULT NOTE ADULT - SUBJECTIVE AND OBJECTIVE BOX
Neurology Consult    Reason for Consult: Patient is a 64y old  Male who presents with a chief complaint of near syncope (02 May 2024 07:47)      HPI:  64 year old M w/ PMH Afib, HTN, HLD, hematuria, s/p cystoscopy, laser enucleation of prostate with morcellation with Dr. Pema Navarro on 2/20/2024. presents with near syncope at doctor's office.           Patient was at his neurologist's office. While sitting on the exam table, suddenly he felt the room closing on him, tunnel vision, and vision sight darkening, and lightheadedness. His doctor lay him down on the exam table. He denies any loss of consciousness. He denies any chest pain/SOB, denies any heart palpitations prior to and after the near syncope episode. He said his doctor felt his pulses, and his pulses were going in and out x 3 times. He denies any recent fever, but has some runny nose, dry cough, and chills recently.            Of note he was diagnosed with UTI and finished antibiotics 1 week ago. Also he was found to have atrial fibrillation on March 25, 2024 and was prescribed eliquis, he wore a prolonged heart monitor for 1 month though no sure the heart monitor results. He also noticed since he started eliquis he started having hematuria, and he was planned to have a cystoscopy.               ED course: patient was brought in by ambulance directly from doctor's office to the ED.  (29 Apr 2024 16:21)       PAST MEDICAL & SURGICAL HISTORY:  Hyperlipidemia      BPH with urinary obstruction      History of urinary retention      Beckwith catheter in place      No significant past surgical history          Allergies: Allergies    No Known Allergies    Intolerances        Social History: Denies toxic habits including tobacco, ETOH or illicit drugs.    Family History: FAMILY HISTORY:  . No family history of strokes    Medications: MEDICATIONS  (STANDING):  aspirin enteric coated 81 milliGRAM(s) Oral daily  atorvastatin 10 milliGRAM(s) Oral at bedtime  chlorhexidine 2% Cloths 1 Application(s) Topical daily  nadolol 20 milliGRAM(s) Oral daily    MEDICATIONS  (PRN):  acetaminophen     Tablet .. 650 milliGRAM(s) Oral every 6 hours PRN Temp greater or equal to 38C (100.4F), Mild Pain (1 - 3)  melatonin 3 milliGRAM(s) Oral at bedtime PRN Insomnia      Review of Systems:  CONSTITUTIONAL:  No weight loss, fever, chills, weakness or fatigue.  HEENT:  Eyes:  No visual loss, blurred vision, double vision or yellow sclera. Ears, Nose, Throat:  No hearing loss, sneezing, congestion, runny nose or sore throat.  SKIN:  No rash or itching.  CARDIOVASCULAR:  No chest pain, chest pressure or chest discomfort. No palpitations or edema.  RESPIRATORY:  No shortness of breath, cough or sputum.  GASTROINTESTINAL:  No anorexia, nausea, vomiting or diarrhea. No abdominal pain or blood.  GENITOURINARY:  No burning on urination or incontinence   NEUROLOGICAL:  No headache, dizziness, syncope, paralysis, ataxia, numbness or tingling in the extremities. No change in bowel or bladder control. no limb weakness. no vision changes.   MUSCULOSKELETAL:  No muscle, back pain, joint pain or stiffness.  HEMATOLOGIC:  No anemia, bleeding or bruising.  LYMPHATICS:  No enlarged nodes. No history of splenectomy.  PSYCHIATRIC:  No history of depression or anxiety.  ENDOCRINOLOGIC:  No reports of sweating, cold or heat intolerance. No polyuria or polydipsia.      Vitals:  Vital Signs Last 24 Hrs  T(C): 36.9 (02 May 2024 04:24), Max: 36.9 (02 May 2024 04:24)  T(F): 98.5 (02 May 2024 04:24), Max: 98.5 (02 May 2024 04:24)  HR: 69 (02 May 2024 04:24) (69 - 73)  BP: 160/80 (02 May 2024 04:24) (146/70 - 166/80)  BP(mean): --  RR: 18 (02 May 2024 04:24) (18 - 18)  SpO2: 96% (02 May 2024 04:24) (96% - 97%)    Parameters below as of 02 May 2024 04:24  Patient On (Oxygen Delivery Method): room air    Orthostatic VS    04-30-24 @ 14:00  Lying BP: Orthostatic BP (Lying Systolic): 153/Orthostatic BP (Lying Diastolic (mm Hg)): 73 HR: --   Sitting BP: Orthostatic BP (Sitting Systolic): 160/Orthostatic BP (Sitting Diastolic (mm Hg)): 72 HR: --  Standing BP: Orthostatic BP (Standing Systolic): 143/Orthostatic BP (Standing Diastolic (mm Hg)): 72 HR: --  Site: --   Mode: --    04-30-24 @ 10:30  Lying BP: Orthostatic BP (Lying Systolic): 178/Orthostatic BP (Lying Diastolic (mm Hg)): 86 HR: Orthostatic Pulse (Heart Rate (beats/min)): 78   Sitting BP: Orthostatic BP (Sitting Systolic): 149/Orthostatic BP (Sitting Diastolic (mm Hg)): 81 HR: Orthostatic Pulse (Heart Rate (beats/min)): 76  Standing BP: Orthostatic BP (Standing Systolic): 120/Orthostatic BP (Standing Diastolic (mm Hg)): 64 HR: Orthostatic Pulse (Heart Rate (beats/min)): 88  Site: Orthostatic BP/Pulse (Site): upper left arm   Mode: Orthostatic BP/ Pulse (Mode): electronic      General Exam:   General Appearance: Appropriately dressed and in no acute distress       Head: Normocephalic, atraumatic and no dysmorphic features  Ear, Nose, and Throat: Moist mucous membranes  CVS: S1S2+  Resp: No SOB, no wheeze or rhonchi  GI: soft NT/ND  Extremities: No edema or cyanosis  Skin: No bruises or rashes     Neurological Exam:  Mental Status: Awake, alert and oriented x 3.  Able to follow simple and complex verbal commands. Able to name and repeat. fluent speech. No obvious aphasia or dysarthria noted.   Cranial Nerves: PERRL, EOMI, VFFC, sensation V1-V3 intact,  no obvious facial asymmetry, equal elevation of palate, scm/trap 5/5, tongue is midline on protrusion. no obvious papilledema on fundoscopic exam. hearing is grossly intact.   Motor: Normal bulk, tone and strength throughout. Fine finger movements were intact and symmetric. no tremors or drift noted.    Sensation: Intact to light touch and pinprick throughout. no right/left confusion. no extinction to tactile on DSS. Romberg was negative.   Reflexes: 1+ throughout at biceps, brachioradialis, triceps, patellars and ankles bilaterally and equal. No clonus. R toe and L toe were both downgoing.  Coordination: No dysmetria on FNF or HKS  Gait: cane baseline     Data/Labs/Imaging which I personally reviewed.     Labs:         < from: MR Angio Neck w/wo IV Cont (05.02.24 @ 01:24) >    ACC: [id number] EXAM:  MR ANGIO NECK WAW IC   ORDERED BY: LEROY STREETER     ACC: [id number] EXAM:  MR BRAIN WAW IC   ORDERED BY: LEROY STREETER     ACC: [id number] EXAM:  MR ANGIO BRAIN   ORDERED BY: LEROY STREETER     PROCEDURE DATE:  05/02/2024          INTERPRETATION:  Clinical Indication: Syncope  Comparison: None available    Technique: Multiplanar MR imaging of the brain was obtained with and   without contrast. Time of flight MRA of the neck and [place]   were obtained. Postcontrast images were obtained after the administration   of 10 cc of Gadavist.    Findings:    Brain MRI:    There is no acute infarct. There is no evidence of mass or intracranial   hemorrhage. Ventricles and sulci are normal in size and configuration for   the patient's stated age. No midline shift or other significant mass   effect is noted. There are a few punctate foci of hyperintense T2 signal   within the subcortical and periventricular white matter which are   nonspecific but likely related to chronic microvascular ischemic disease.    Flow voids of the major intracranial vessels at the skull base follow   expected course and contour.    Polypoid mucosal thickening of the paranasal sinuses. Small fluid level   in the right maxillary sinus. Correlate clinically for acute sinusitis.   The mastoids demonstrate no signal abnormality. Bilateral orbits are   within normal limits.    Brain MRA:    There is flow-related signal in the bilateral intradural internal   carotid, anterior cerebral andmiddle cerebral arteries.    There is flow-related signal in the bilateral posterior cerebral,   basilar, and intradural vertebral arteries.    No evidence of aneurysm. Tiny aneurysms can be beyond the resolution of   MRA technique. No evidence of arteriovenous malformation.    Neck MRA:    There is flow-related signal in the bilateral common carotid and cervical   internal carotid arteries. No hemodynamically significant stenosis of the   bilateral cervical ICAs by NASCET criteria.    There is flow-related signal in the bilateral vertebral arteries.    IMPRESSION:    No evidence for intracranial mass, acute territorial infarct, acute   intracranial hemorrhage, or midline shift. No area of abnormal   enhancement.    No hemodynamically significant stenosis in the head or neck.    No intracranial aneurysm.    --- End of Report ---            LIEN JUSTICE MD; Attending Radiologist  This document has been electronically signed. May  2 2024  6:39AM    < end of copied text >

## 2024-05-02 NOTE — PROGRESS NOTE ADULT - SUBJECTIVE AND OBJECTIVE BOX
Patient is a 64y old  Male who presents with a chief complaint of near syncope (02 May 2024 09:43)    Date of servie : 05-02-24 @ 12:32  INTERVAL HPI/OVERNIGHT EVENTS:  T(C): 36.3 (05-02-24 @ 11:04), Max: 36.9 (05-02-24 @ 04:24)  HR: 64 (05-02-24 @ 11:04) (64 - 73)  BP: 106/69 (05-02-24 @ 11:04) (106/69 - 166/80)  RR: 18 (05-02-24 @ 11:04) (18 - 18)  SpO2: 96% (05-02-24 @ 11:04) (96% - 97%)  Wt(kg): --  I&O's Summary    01 May 2024 07:01  -  02 May 2024 07:00  --------------------------------------------------------  IN: 660 mL / OUT: 0 mL / NET: 660 mL        LABS:              CAPILLARY BLOOD GLUCOSE                MEDICATIONS  (STANDING):  aspirin enteric coated 81 milliGRAM(s) Oral daily  atorvastatin 10 milliGRAM(s) Oral at bedtime  chlorhexidine 2% Cloths 1 Application(s) Topical daily  nadolol 20 milliGRAM(s) Oral daily    MEDICATIONS  (PRN):  acetaminophen     Tablet .. 650 milliGRAM(s) Oral every 6 hours PRN Temp greater or equal to 38C (100.4F), Mild Pain (1 - 3)  melatonin 3 milliGRAM(s) Oral at bedtime PRN Insomnia          PHYSICAL EXAM:  GENERAL: NAD, well-groomed, well-developed  HEAD:  Atraumatic, Normocephalic  CHEST/LUNG: Clear to percussion bilaterally; No rales, rhonchi, wheezing, or rubs  HEART: Regular rate and rhythm; No murmurs, rubs, or gallops  ABDOMEN: Soft, Nontender, Nondistended; Bowel sounds present  EXTREMITIES:  2+ Peripheral Pulses, No clubbing, cyanosis, or edema  LYMPH: No lymphadenopathy noted  SKIN: No rashes or lesions    Care Discussed with Consultants/Other Providers [ ] YES  [ ] NO

## 2024-05-03 PROCEDURE — 93454 CORONARY ARTERY ANGIO S&I: CPT | Mod: 26,59

## 2024-05-03 PROCEDURE — 99152 MOD SED SAME PHYS/QHP 5/>YRS: CPT

## 2024-05-03 PROCEDURE — 92928 PRQ TCAT PLMT NTRAC ST 1 LES: CPT | Mod: LD

## 2024-05-03 PROCEDURE — 0523T NTRAPX C FFR W/3D FUNCJL MAP: CPT

## 2024-05-03 PROCEDURE — 93010 ELECTROCARDIOGRAM REPORT: CPT | Mod: 76

## 2024-05-03 RX ORDER — SODIUM CHLORIDE 9 MG/ML
250 INJECTION INTRAMUSCULAR; INTRAVENOUS; SUBCUTANEOUS ONCE
Refills: 0 | Status: COMPLETED | OUTPATIENT
Start: 2024-05-03 | End: 2024-05-03

## 2024-05-03 RX ORDER — SODIUM CHLORIDE 9 MG/ML
250 INJECTION INTRAMUSCULAR; INTRAVENOUS; SUBCUTANEOUS
Refills: 0 | Status: DISCONTINUED | OUTPATIENT
Start: 2024-05-03 | End: 2024-05-04

## 2024-05-03 RX ORDER — ATORVASTATIN CALCIUM 80 MG/1
40 TABLET, FILM COATED ORAL AT BEDTIME
Refills: 0 | Status: DISCONTINUED | OUTPATIENT
Start: 2024-05-03 | End: 2024-05-04

## 2024-05-03 RX ORDER — CLOPIDOGREL BISULFATE 75 MG/1
75 TABLET, FILM COATED ORAL DAILY
Refills: 0 | Status: DISCONTINUED | OUTPATIENT
Start: 2024-05-04 | End: 2024-05-04

## 2024-05-03 RX ORDER — SODIUM CHLORIDE 9 MG/ML
1000 INJECTION INTRAMUSCULAR; INTRAVENOUS; SUBCUTANEOUS
Refills: 0 | Status: DISCONTINUED | OUTPATIENT
Start: 2024-05-03 | End: 2024-05-04

## 2024-05-03 RX ADMIN — Medication 81 MILLIGRAM(S): at 12:10

## 2024-05-03 RX ADMIN — ATORVASTATIN CALCIUM 40 MILLIGRAM(S): 80 TABLET, FILM COATED ORAL at 21:07

## 2024-05-03 RX ADMIN — SODIUM CHLORIDE 100 MILLILITER(S): 9 INJECTION INTRAMUSCULAR; INTRAVENOUS; SUBCUTANEOUS at 18:30

## 2024-05-03 RX ADMIN — CHLORHEXIDINE GLUCONATE 1 APPLICATION(S): 213 SOLUTION TOPICAL at 12:10

## 2024-05-03 RX ADMIN — SODIUM CHLORIDE 75 MILLILITER(S): 9 INJECTION INTRAMUSCULAR; INTRAVENOUS; SUBCUTANEOUS at 16:22

## 2024-05-03 RX ADMIN — NADOLOL 20 MILLIGRAM(S): 80 TABLET ORAL at 05:35

## 2024-05-03 RX ADMIN — SODIUM CHLORIDE 500 MILLILITER(S): 9 INJECTION INTRAMUSCULAR; INTRAVENOUS; SUBCUTANEOUS at 16:22

## 2024-05-03 NOTE — PROGRESS NOTE ADULT - SUBJECTIVE AND OBJECTIVE BOX
Neurology      S; Patient seen doing okay.  not dizzy       Medications: MEDICATIONS  (STANDING):  aspirin enteric coated 81 milliGRAM(s) Oral daily  atorvastatin 10 milliGRAM(s) Oral at bedtime  chlorhexidine 2% Cloths 1 Application(s) Topical daily  nadolol 20 milliGRAM(s) Oral daily    MEDICATIONS  (PRN):  acetaminophen     Tablet .. 650 milliGRAM(s) Oral every 6 hours PRN Temp greater or equal to 38C (100.4F), Mild Pain (1 - 3)  melatonin 3 milliGRAM(s) Oral at bedtime PRN Insomnia       Vitals:  Vital Signs Last 24 Hrs  T(C): 36.8 (03 May 2024 04:14), Max: 37 (02 May 2024 21:05)  T(F): 98.2 (03 May 2024 04:14), Max: 98.6 (02 May 2024 21:05)  HR: 67 (03 May 2024 04:14) (61 - 67)  BP: 133/75 (03 May 2024 04:14) (106/69 - 148/76)  BP(mean): --  RR: 18 (03 May 2024 04:14) (18 - 18)  SpO2: 96% (03 May 2024 04:14) (96% - 98%)    Parameters below as of 03 May 2024 04:14  Patient On (Oxygen Delivery Method): room air    Orthostatic VS            General Exam:   General Appearance: Appropriately dressed and in no acute distress       Head: Normocephalic, atraumatic and no dysmorphic features  Ear, Nose, and Throat: Moist mucous membranes  CVS: S1S2+  Resp: No SOB, no wheeze or rhonchi  GI: soft NT/ND  Extremities: No edema or cyanosis  Skin: No bruises or rashes     Neurological Exam:  Mental Status: Awake, alert and oriented x 3.  Able to follow simple and complex verbal commands. Able to name and repeat. fluent speech. No obvious aphasia or dysarthria noted.   Cranial Nerves: PERRL, EOMI, VFFC, sensation V1-V3 intact,  no obvious facial asymmetry, equal elevation of palate, scm/trap 5/5, tongue is midline on protrusion. no obvious papilledema on fundoscopic exam. hearing is grossly intact.   Motor: Normal bulk, tone and strength throughout. Fine finger movements were intact and symmetric. no tremors or drift noted.    Sensation: Intact to light touch and pinprick throughout. no right/left confusion. no extinction to tactile on DSS. Romberg was negative.   Reflexes: 1+ throughout at biceps, brachioradialis, triceps, patellars and ankles bilaterally and equal. No clonus. R toe and L toe were both downgoing.  Coordination: No dysmetria on FNF or HKS  Gait: cane baseline     Data/Labs/Imaging which I personally reviewed.     Labs: no new labs          < from: MR Angio Neck w/wo IV Cont (05.02.24 @ 01:24) >    ACC: [id number] EXAM:  MR ANGIO NECK WAW IC   ORDERED BY: LEROY STREETER     ACC: [id number] EXAM:  MR BRAIN WAW IC   ORDERED BY: LEROY STREETER     ACC: [id number] EXAM:  MR ANGIO BRAIN   ORDERED BY: LEROY STREETER     PROCEDURE DATE:  05/02/2024          INTERPRETATION:  Clinical Indication: Syncope  Comparison: None available    Technique: Multiplanar MR imaging of the brain was obtained with and   without contrast. Time of flight MRA of the neck and Craig of Almazan   were obtained. Postcontrast images were obtained after the administration   of 10 cc of Gadavist.    Findings:    Brain MRI:    There is no acute infarct. There is no evidence of mass or intracranial   hemorrhage. Ventricles and sulci are normal in size and configuration for   the patient's stated age. No midline shift or other significant mass   effect is noted. There are a few punctate foci of hyperintense T2 signal   within the subcortical and periventricular white matter which are   nonspecific but likely related to chronic microvascular ischemic disease.    Flow voids of the major intracranial vessels at the skull base follow   expected course and contour.    Polypoid mucosal thickening of the paranasal sinuses. Small fluid level   in the right maxillary sinus. Correlate clinically for acute sinusitis.   The mastoids demonstrate no signal abnormality. Bilateral orbits are   within normal limits.    Brain MRA:    There is flow-related signal in the bilateral intradural internal   carotid, anterior cerebral andmiddle cerebral arteries.    There is flow-related signal in the bilateral posterior cerebral,   basilar, and intradural vertebral arteries.    No evidence of aneurysm. Tiny aneurysms can be beyond the resolution of   MRA technique. No evidence of arteriovenous malformation.    Neck MRA:    There is flow-related signal in the bilateral common carotid and cervical   internal carotid arteries. No hemodynamically significant stenosis of the   bilateral cervical ICAs by NASCET criteria.    There is flow-related signal in the bilateral vertebral arteries.    IMPRESSION:    No evidence for intracranial mass, acute territorial infarct, acute   intracranial hemorrhage, or midline shift. No area of abnormal   enhancement.    No hemodynamically significant stenosis in the head or neck.    No intracranial aneurysm.    --- End of Report ---            LIEN JUSTICE MD; Attending Radiologist  This document has been electronically signed. May  2 2024  6:39AM    < end of copied text >            < from: TTE W or WO Ultrasound Enhancing Agent (04.30.24 @ 11:10) >    TRANSTHORACIC ECHOCARDIOGRAM REPORT  ________________________________________________________________________________                                      _______       Pt. Name:       PADMA HORTA Study Date:    4/30/2024  MRN:            [id number]       YOB: 1960  Accession #:    [id number]        Age:           64 years  Account#:       [id number]     Gender:        M  Visit ID#  Heart Rate:                      Height:        75.00 in (190.50 cm)  Rhythm:          Weight:        231.00 lb (104.78 kg)  Blood Pressure: 164/83 mmHg      BSA/BMI:       2.33 m² / 28.87 kg/m²  ________________________________________________________________________________________  Referring Physician:    [id number] Eamon Longoria  Interpreting Physician: Justice Lott M.D.  Primary Sonographer:    Samantha Martinez    CPT:                ECHO TTE WITH CON COMP W DOPP - .m;DEFINITY ECHO                      CONTRAST PER ML - .m;DEFINITY ECHO CONTRAST PER ML             WASTED - .m  Indication(s):      Syncope and collapse - R55  Procedure:          Transthoracic echocardiogram with 2-D, M-mode and complete                      spectral and color flow Doppler.  Ordering Location:  [facility]  Admission Status:   Inpatient  Contrast Injection: Verbal consent was obtained for injection of Ultrasonic                      Enhancing Agent following a discussion of risks and                      benefits.                      Endocardial visualization enhanced with 2 ml of Definity                      Ultrasound enhancing agent (Lot#:[id number] Exp.Date:12/2024                      Discarded Dose:8ml).  UEA Reaction:       Patient had no adverse reaction after injection of                      Ultrasound Enhancing Agent.  Study Information:  Image quality for this study is adequate.    _______________________________________________________________________________________     CONCLUSIONS:      1. Left ventricular cavity is normal in size. Left ventricularwall thickness is normal. Left ventricular systolic function is normal with an ejection fraction of 55 % by Loza's method of disks. There are no regional wall motion abnormalities seen.   2. Normal left ventricular diastolic function, with normal filling pressure.   3. Normal right ventricular cavity size, with normal wall thickness, and normal systolic function.   4. Normal left and right atrial size.   5. No significant valvular disease.   6. No pericardial effusion seen.   7. No prior echocardiogram is available for comparison.    ________________________________________________________________________________________  FINDINGS:     Left Ventricle:  The left ventricular cavity is normal in size. Left ventricular wall thickness is normal. Left ventricular systolic function is normal with a calculated ejection fraction of 55 % by the Loza's biplane method of disks. There are no regional wall motion abnormalities seen. There is normal left ventricular diastolic function, with normalfilling pressure. There is no evidence of a left ventricular thrombus.     Right Ventricle:  The right ventricular cavity is normal in size, with normal wall thickness and normal systolic function. Tricuspid annular plane systolic excursion (TAPSE) is 2.4 cm (normal >=1.7 cm).     Left Atrium:  The left atrium is normal in size.     Right Atrium:  The right atrium is normal in size.     Interatrial Septum:  The interatrial septum appears intact.     Aortic Valve:  The aortic valve is tricuspid with normal leaflet excursion. There is focal calcification of the aortic valve leaflets. There is no aortic valve stenosis. There is no evidence of aortic regurgitation.     Mitral Valve:  Structurally normal mitral valve with normal leaflet excursion.There is calcification of the mitral valve annulus. There is no mitral valve stenosis. There is trace mitral regurgitation.     Tricuspid Valve:  Structurally normal tricuspid valve with normal leaflet excursion. There is trace tricuspid regurgitation.     Pulmonic Valve:  The pulmonic valve was not well visualized. There is trace pulmonic regurgitation.     Aorta:  The aortic root appears normal in size. The aortic root at the sinuses of Valsalva is normal in size, measuring 3.60 cm (indexed 1.54 cm/m²).     Pericardium:  No pericardial effusion seen.     Systemic Veins:  The inferior vena cava is normal in size (normal <2.1cm) with normal inspiratory collapse (normal >50%) consistent with normal right atrial pressure (~3, range 0-5mmHg).  ____________________________________________________________________  QUANTITATIVE DATA:  Left Ventricle Measurements: (Indexed to BSA)     IVSd (2D):   1.1 cm  LVPWd (2D):  0.7 cm  LVIDd (2D):  5.4 cm  LVIDs (2D):  3.7 cm  LV Mass:     178 g  76.3 g/m²  LV Vol d, MOD A2C: 159.0 ml 68.15 ml/m²  LV Vol d, MOD A4C: 159.0 ml 68.15 ml/m²  LV Vol d, MOD BP:  160.5 ml 68.80 ml/m²  LV Vol s, MOD A2C: 68.6 ml  29.40 ml/m²  LV Vol s, MOD A4C: 72.9 ml  31.25 ml/m²  LV Vol s, MOD BP:  71.4 ml  30.62 ml/m²  LVOT SV MOD BP:    89.1 ml  LV EF% MOD BP:     55 %     MV E Vmax:    0.53 m/s  MV A Vmax:    0.51 m/s  MV E/A:       1.05  e' lateral:   9.73 cm/s  e' medial:    9.81 cm/s  E/e' lateral: 5.46  E/e' medial:  5.41  E/e' Average: 5.44    Aorta Measurements: (Normal range) (Indexed to BSA)     Sinuses of Valsalva: 3.60 cm (3.1 - 3.7 cm)       Left Atrium Measurements: (Indexed to BSA)  LA Diam 2D:        4.40 cm  LA Vol s, MOD A4C: 58.10 ml.  LA Vol s, MOD A2C: 55.20 ml.    Right Ventricle Measurements:     TAPSE:           2.4 cm  RV Base (RVID1): 4.0 cm  RV Mid (RVID2):  2.8 cm       LVOT / RVOT/ Qp/Qs Data: (Indexed to BSA)  LVOT Diameter: 2.30 cm  LVOT Area:     4.15 cm²    Mitral Valve Measurements:     MV E Vmax: 0.5 m/s  MV A Vmax: 0.5 m/s  MV E/A:    1.0       Tricuspid Valve Measurements:     RA Pressure: 3 mmHg    ________________________________________________________________________________________  Electronically signed on 4/30/2024 at 2:00:18 PM by Justice Lott M.D.         *** Final ***    < end of copied text >

## 2024-05-03 NOTE — PROGRESS NOTE ADULT - SUBJECTIVE AND OBJECTIVE BOX
Subjective: Patient seen and examined. No new events except as noted.     SUBJECTIVE/ROS:  Nad      MEDICATIONS:  MEDICATIONS  (STANDING):  aspirin enteric coated 81 milliGRAM(s) Oral daily  atorvastatin 10 milliGRAM(s) Oral at bedtime  chlorhexidine 2% Cloths 1 Application(s) Topical daily  nadolol 20 milliGRAM(s) Oral daily      PHYSICAL EXAM:  T(C): 36.8 (05-03-24 @ 04:14), Max: 37 (05-02-24 @ 21:05)  HR: 67 (05-03-24 @ 04:14) (61 - 67)  BP: 133/75 (05-03-24 @ 04:14) (106/69 - 148/76)  RR: 18 (05-03-24 @ 04:14) (18 - 18)  SpO2: 96% (05-03-24 @ 04:14) (96% - 98%)  Wt(kg): --  I&O's Summary    02 May 2024 07:01  -  03 May 2024 07:00  --------------------------------------------------------  IN: 1640 mL / OUT: 0 mL / NET: 1640 mL            JVP: Normal  Neck: supple  Lung: clear   CV: S1 S2 , Murmur:  Abd: soft  Ext: No edema  neuro: Awake / alert  Psych: flat affect  Skin: normal``    LABS/DATA:    CARDIAC MARKERS:                  proBNP:   Lipid Profile:   HgA1c:   TSH:     TELE:  EKG:

## 2024-05-03 NOTE — PROGRESS NOTE ADULT - SUBJECTIVE AND OBJECTIVE BOX
Patient is a 64y old  Male who presents with a chief complaint of near syncope (03 May 2024 08:54)    Date of servie : 05-03-24 @ 14:37  INTERVAL HPI/OVERNIGHT EVENTS:  T(C): 36.2 (05-03-24 @ 11:19), Max: 37 (05-02-24 @ 21:05)  HR: 61 (05-03-24 @ 11:19) (61 - 67)  BP: 118/71 (05-03-24 @ 11:19) (118/71 - 148/76)  RR: 18 (05-03-24 @ 11:19) (18 - 18)  SpO2: 98% (05-03-24 @ 11:19) (96% - 98%)  Wt(kg): --  I&O's Summary    02 May 2024 07:01  -  03 May 2024 07:00  --------------------------------------------------------  IN: 1640 mL / OUT: 0 mL / NET: 1640 mL    03 May 2024 07:01  -  03 May 2024 14:37  --------------------------------------------------------  IN: 720 mL / OUT: 0 mL / NET: 720 mL        LABS:              CAPILLARY BLOOD GLUCOSE                MEDICATIONS  (STANDING):  aspirin enteric coated 81 milliGRAM(s) Oral daily  atorvastatin 10 milliGRAM(s) Oral at bedtime  chlorhexidine 2% Cloths 1 Application(s) Topical daily  nadolol 20 milliGRAM(s) Oral daily    MEDICATIONS  (PRN):  acetaminophen     Tablet .. 650 milliGRAM(s) Oral every 6 hours PRN Temp greater or equal to 38C (100.4F), Mild Pain (1 - 3)  melatonin 3 milliGRAM(s) Oral at bedtime PRN Insomnia          PHYSICAL EXAM:  GENERAL: NAD, well-groomed, well-developed  HEAD:  Atraumatic, Normocephalic  CHEST/LUNG: Clear to percussion bilaterally; No rales, rhonchi, wheezing, or rubs  HEART: Regular rate and rhythm; No murmurs, rubs, or gallops  ABDOMEN: Soft, Nontender, Nondistended; Bowel sounds present  EXTREMITIES:  2+ Peripheral Pulses, No clubbing, cyanosis, or edema  LYMPH: No lymphadenopathy noted  SKIN: No rashes or lesions    Care Discussed with Consultants/Other Providers [ ] YES  [ ] NO

## 2024-05-04 ENCOUNTER — TRANSCRIPTION ENCOUNTER (OUTPATIENT)
Age: 64
End: 2024-05-04

## 2024-05-04 VITALS
TEMPERATURE: 98 F | RESPIRATION RATE: 18 BRPM | OXYGEN SATURATION: 98 % | SYSTOLIC BLOOD PRESSURE: 130 MMHG | DIASTOLIC BLOOD PRESSURE: 78 MMHG | HEART RATE: 60 BPM

## 2024-05-04 LAB
ANION GAP SERPL CALC-SCNC: 11 MMOL/L — SIGNIFICANT CHANGE UP (ref 5–17)
BUN SERPL-MCNC: 30 MG/DL — HIGH (ref 7–23)
CALCIUM SERPL-MCNC: 8.8 MG/DL — SIGNIFICANT CHANGE UP (ref 8.4–10.5)
CHLORIDE SERPL-SCNC: 104 MMOL/L — SIGNIFICANT CHANGE UP (ref 96–108)
CO2 SERPL-SCNC: 24 MMOL/L — SIGNIFICANT CHANGE UP (ref 22–31)
CREAT SERPL-MCNC: 1.21 MG/DL — SIGNIFICANT CHANGE UP (ref 0.5–1.3)
EGFR: 67 ML/MIN/1.73M2 — SIGNIFICANT CHANGE UP
GLUCOSE SERPL-MCNC: 79 MG/DL — SIGNIFICANT CHANGE UP (ref 70–99)
HCT VFR BLD CALC: 38.6 % — LOW (ref 39–50)
HGB BLD-MCNC: 12.5 G/DL — LOW (ref 13–17)
MCHC RBC-ENTMCNC: 29 PG — SIGNIFICANT CHANGE UP (ref 27–34)
MCHC RBC-ENTMCNC: 32.4 GM/DL — SIGNIFICANT CHANGE UP (ref 32–36)
MCV RBC AUTO: 89.6 FL — SIGNIFICANT CHANGE UP (ref 80–100)
NRBC # BLD: 0 /100 WBCS — SIGNIFICANT CHANGE UP (ref 0–0)
PLATELET # BLD AUTO: 233 K/UL — SIGNIFICANT CHANGE UP (ref 150–400)
POTASSIUM SERPL-MCNC: 4.2 MMOL/L — SIGNIFICANT CHANGE UP (ref 3.5–5.3)
POTASSIUM SERPL-SCNC: 4.2 MMOL/L — SIGNIFICANT CHANGE UP (ref 3.5–5.3)
RBC # BLD: 4.31 M/UL — SIGNIFICANT CHANGE UP (ref 4.2–5.8)
RBC # FLD: 13.4 % — SIGNIFICANT CHANGE UP (ref 10.3–14.5)
SODIUM SERPL-SCNC: 139 MMOL/L — SIGNIFICANT CHANGE UP (ref 135–145)
WBC # BLD: 6.29 K/UL — SIGNIFICANT CHANGE UP (ref 3.8–10.5)
WBC # FLD AUTO: 6.29 K/UL — SIGNIFICANT CHANGE UP (ref 3.8–10.5)

## 2024-05-04 PROCEDURE — 87150 DNA/RNA AMPLIFIED PROBE: CPT

## 2024-05-04 PROCEDURE — 87637 SARSCOV2&INF A&B&RSV AMP PRB: CPT

## 2024-05-04 PROCEDURE — 85018 HEMOGLOBIN: CPT

## 2024-05-04 PROCEDURE — 70549 MR ANGIOGRAPH NECK W/O&W/DYE: CPT

## 2024-05-04 PROCEDURE — 78452 HT MUSCLE IMAGE SPECT MULT: CPT | Mod: MC

## 2024-05-04 PROCEDURE — 84484 ASSAY OF TROPONIN QUANT: CPT

## 2024-05-04 PROCEDURE — C1887: CPT

## 2024-05-04 PROCEDURE — 87186 SC STD MICRODIL/AGAR DIL: CPT

## 2024-05-04 PROCEDURE — 82962 GLUCOSE BLOOD TEST: CPT

## 2024-05-04 PROCEDURE — 97530 THERAPEUTIC ACTIVITIES: CPT

## 2024-05-04 PROCEDURE — 80053 COMPREHEN METABOLIC PANEL: CPT

## 2024-05-04 PROCEDURE — 93454 CORONARY ARTERY ANGIO S&I: CPT | Mod: 59

## 2024-05-04 PROCEDURE — A9500: CPT

## 2024-05-04 PROCEDURE — 81001 URINALYSIS AUTO W/SCOPE: CPT

## 2024-05-04 PROCEDURE — 84132 ASSAY OF SERUM POTASSIUM: CPT

## 2024-05-04 PROCEDURE — C1769: CPT

## 2024-05-04 PROCEDURE — 96360 HYDRATION IV INFUSION INIT: CPT

## 2024-05-04 PROCEDURE — 71045 X-RAY EXAM CHEST 1 VIEW: CPT

## 2024-05-04 PROCEDURE — 0225U NFCT DS DNA&RNA 21 SARSCOV2: CPT

## 2024-05-04 PROCEDURE — 85025 COMPLETE CBC W/AUTO DIFF WBC: CPT

## 2024-05-04 PROCEDURE — 97161 PT EVAL LOW COMPLEX 20 MIN: CPT

## 2024-05-04 PROCEDURE — 85014 HEMATOCRIT: CPT

## 2024-05-04 PROCEDURE — 82330 ASSAY OF CALCIUM: CPT

## 2024-05-04 PROCEDURE — 93017 CV STRESS TEST TRACING ONLY: CPT

## 2024-05-04 PROCEDURE — 0523T NTRAPX C FFR W/3D FUNCJL MAP: CPT

## 2024-05-04 PROCEDURE — 36415 COLL VENOUS BLD VENIPUNCTURE: CPT

## 2024-05-04 PROCEDURE — A9585: CPT

## 2024-05-04 PROCEDURE — 76770 US EXAM ABDO BACK WALL COMP: CPT

## 2024-05-04 PROCEDURE — C1894: CPT

## 2024-05-04 PROCEDURE — 70544 MR ANGIOGRAPHY HEAD W/O DYE: CPT

## 2024-05-04 PROCEDURE — 84145 PROCALCITONIN (PCT): CPT

## 2024-05-04 PROCEDURE — 70553 MRI BRAIN STEM W/O & W/DYE: CPT | Mod: MC

## 2024-05-04 PROCEDURE — 83605 ASSAY OF LACTIC ACID: CPT

## 2024-05-04 PROCEDURE — 99285 EMERGENCY DEPT VISIT HI MDM: CPT

## 2024-05-04 PROCEDURE — 93005 ELECTROCARDIOGRAM TRACING: CPT

## 2024-05-04 PROCEDURE — 87086 URINE CULTURE/COLONY COUNT: CPT

## 2024-05-04 PROCEDURE — C1725: CPT

## 2024-05-04 PROCEDURE — 83880 ASSAY OF NATRIURETIC PEPTIDE: CPT

## 2024-05-04 PROCEDURE — 93970 EXTREMITY STUDY: CPT

## 2024-05-04 PROCEDURE — 84295 ASSAY OF SERUM SODIUM: CPT

## 2024-05-04 PROCEDURE — C8929: CPT

## 2024-05-04 PROCEDURE — 82803 BLOOD GASES ANY COMBINATION: CPT

## 2024-05-04 PROCEDURE — 97116 GAIT TRAINING THERAPY: CPT

## 2024-05-04 PROCEDURE — 82435 ASSAY OF BLOOD CHLORIDE: CPT

## 2024-05-04 PROCEDURE — 85027 COMPLETE CBC AUTOMATED: CPT

## 2024-05-04 PROCEDURE — 83735 ASSAY OF MAGNESIUM: CPT

## 2024-05-04 PROCEDURE — 87077 CULTURE AEROBIC IDENTIFY: CPT

## 2024-05-04 PROCEDURE — 82947 ASSAY GLUCOSE BLOOD QUANT: CPT

## 2024-05-04 PROCEDURE — C1874: CPT

## 2024-05-04 PROCEDURE — 83690 ASSAY OF LIPASE: CPT

## 2024-05-04 PROCEDURE — 80048 BASIC METABOLIC PNL TOTAL CA: CPT

## 2024-05-04 PROCEDURE — 87040 BLOOD CULTURE FOR BACTERIA: CPT

## 2024-05-04 PROCEDURE — C9600: CPT | Mod: LD

## 2024-05-04 RX ORDER — ATORVASTATIN CALCIUM 80 MG/1
1 TABLET, FILM COATED ORAL
Refills: 0 | DISCHARGE

## 2024-05-04 RX ORDER — ASPIRIN/CALCIUM CARB/MAGNESIUM 324 MG
1 TABLET ORAL
Qty: 6 | Refills: 0
Start: 2024-05-04 | End: 2024-05-09

## 2024-05-04 RX ORDER — METOPROLOL TARTRATE 50 MG
1 TABLET ORAL
Refills: 0 | DISCHARGE

## 2024-05-04 RX ORDER — NADOLOL 80 MG/1
1 TABLET ORAL
Qty: 30 | Refills: 0
Start: 2024-05-04 | End: 2024-06-02

## 2024-05-04 RX ORDER — CLOPIDOGREL BISULFATE 75 MG/1
1 TABLET, FILM COATED ORAL
Qty: 30 | Refills: 0
Start: 2024-05-04 | End: 2024-06-02

## 2024-05-04 RX ORDER — ATORVASTATIN CALCIUM 80 MG/1
1 TABLET, FILM COATED ORAL
Qty: 30 | Refills: 0
Start: 2024-05-04 | End: 2024-06-02

## 2024-05-04 RX ORDER — APIXABAN 2.5 MG/1
5 TABLET, FILM COATED ORAL EVERY 12 HOURS
Refills: 0 | Status: DISCONTINUED | OUTPATIENT
Start: 2024-05-04 | End: 2024-05-04

## 2024-05-04 RX ADMIN — CLOPIDOGREL BISULFATE 75 MILLIGRAM(S): 75 TABLET, FILM COATED ORAL at 11:03

## 2024-05-04 RX ADMIN — NADOLOL 20 MILLIGRAM(S): 80 TABLET ORAL at 05:36

## 2024-05-04 RX ADMIN — APIXABAN 5 MILLIGRAM(S): 2.5 TABLET, FILM COATED ORAL at 05:36

## 2024-05-04 RX ADMIN — Medication 81 MILLIGRAM(S): at 11:03

## 2024-05-04 RX ADMIN — CHLORHEXIDINE GLUCONATE 1 APPLICATION(S): 213 SOLUTION TOPICAL at 11:53

## 2024-05-04 NOTE — DISCHARGE NOTE PROVIDER - HOSPITAL COURSE
HPI:  64 year old M w/ PMH Afib, HTN, HLD, hematuria, s/p cystoscopy, laser enucleation of prostate with morcellation with Dr. Pema Navarro on 2/20/2024. presents with near syncope at doctor's office.           Patient was at his neurologist's office. While sitting on the exam table, suddenly he felt the room closing on him, tunnel vision, and vision sight darkening, and lightheadedness. His doctor lay him down on the exam table. He denies any loss of consciousness. He denies any chest pain/SOB, denies any heart palpitations prior to and after the near syncope episode. He said his doctor felt his pulses, and his pulses were going in and out x 3 times. He denies any recent fever, but has some runny nose, dry cough, and chills recently.            Of note he was diagnosed with UTI and finished antibiotics 1 week ago. Also he was found to have atrial fibrillation on March 25, 2024 and was prescribed eliquis, he wore a prolonged heart monitor for 1 month though no sure the heart monitor results. He also noticed since he started eliquis he started having hematuria, and he was planned to have a cystoscopy.               ED course: patient was brought in by ambulance directly from doctor's office to the ED.  (29 Apr 2024 16:21)    Hospital Course:      Important Medication Changes and Reason:    Active or Pending Issues Requiring Follow-up:    Advanced Directives:   [ ] Full code  [ ] DNR  [ ] Hospice    Discharge Diagnoses:         HPI:  64 year old M w/ PMH Afib, HTN, HLD, hematuria, s/p cystoscopy, laser enucleation of prostate with morcellation with Dr. Pema Navarro on 2/20/2024. presents with near syncope at doctor's office. Patient was at his neurologist's office. While sitting on the exam table, suddenly he felt the room closing on him, tunnel vision, and vision sight darkening, and lightheadedness. His doctor lay him down on the exam table. He denies any loss of consciousness. He denies any chest pain/SOB, denies any heart palpitations prior to and after the near syncope episode. He said his doctor felt his pulses, and his pulses were going in and out x 3 times. He denies any recent fever, but has some runny nose, dry cough, and chills recently. Off note he was diagnosed with UTI and finished antibiotics 1 week ago. Also he was found to have atrial fibrillation on March 25, 2024 and was prescribed eliquis, he wore a prolonged heart monitor for 1 month though no sure the heart monitor results. He also noticed since he started eliquis he started having hematuria, and he was planned to have a cystoscopy.     ED course: patient was brought in by ambulance directly from doctor's office to the ED.  (29 Apr 2024 16:21)    Hospital Course: 64 year old M w/ PMH Afib, HTN, HLD presents with near syncope.   Patient with low grade fever, nasal congestion / URI with asymptomatic bacteriuria  CoNS is likely a procurement contaminant. Repeat BC sent. RVP +entero / rhino. Pyuria but no symptoms and known chronic kidney stones  Monitor off antibiotics per ID. 4/29 BC (+) Staphylococcus epidermidis. 4/29 BC (+) Staphylococcus hominis.  Given 2 different CoNS IDs, more suggestive of procurement contaminant per Infectious Disease.    Stress test is abn for ischemia. Cath shows severe prox and mid LAD disease s/p PCI.   Cont asa, DC after one week on 5/10/24. Cont plavix. On a/c with Eliquis for afib.    Important Medication Changes and Reason:  STOP ASPIRIN after one week on 5/10/24    Active or Pending Issues Requiring Follow-up:  Follow-up with your Cardiologist and Neurologist     Advanced Directives:   [X  Full code  [ ] DNR  [ ] Hospice    Discharge Diagnoses:  Near Syncope  Chest pain   CAD  Entero rhino virus

## 2024-05-04 NOTE — DISCHARGE NOTE PROVIDER - NSDCFUADDAPPT_GEN_ALL_CORE_FT
STOP ASPIRIN after one week on 5/10/24  APPTS ARE READY TO BE MADE: [ ] YES    Best Family or Patient Contact (if needed):    Additional Information about above appointments (if needed):    1: Dr. Merida (appointment on 5/7/24)  2: Dr. Wood  3:     Other comments or requests:

## 2024-05-04 NOTE — PROGRESS NOTE ADULT - PROVIDER SPECIALTY LIST ADULT
Hospitalist
Intervent Cardiology
Cardiology
Hospitalist
Infectious Disease
Cardiology
Hospitalist
Hospitalist
Neurology

## 2024-05-04 NOTE — CHART NOTE - NSCHARTNOTEFT_GEN_A_CORE
PA Medicine note    Pt is s/p LHC through RRA. Procedure conclusion: Successful PCI with DELIA to proximal and mid LAD.  Moderate RCA lesion was angio FFR negative (0.89).  DAPT for 6 mths  Right radial artery site free from erythema, pain, warmth, swelling, bleeding, tenderness, and no hematoma seen. Dressing is clean and dry. B/L radial pulses are equal and easy to palpate. VSS, pt has no concerns or complaints at this time.    Susana Acevedo PA-C  Medicine.
Discharge Note:    Requested by Dr. Longoria to facilitate d/c home.  Cleared by Cardiology. To follow-up with Dr. Merida on 5/7/24.  V/s, labs, diagnostics reviewed, pt stable to d/c now.  Medication reconciliation reviewed, revised, and resolved with Dr. Longoria who medically cleared w/ f/u as directed.   Please refer to d/c note for hospital details.    PAULINA Cespedesc

## 2024-05-04 NOTE — DISCHARGE NOTE PROVIDER - NSDCCPCAREPLAN_GEN_ALL_CORE_FT
PRINCIPAL DISCHARGE DIAGNOSIS  Diagnosis: Near syncope  Assessment and Plan of Treatment: Fall precautions  Follow-up with your Neurologist      SECONDARY DISCHARGE DIAGNOSES  Diagnosis: CAD (coronary artery disease)  Assessment and Plan of Treatment: Had a left heart catherization with stent placement  Take medication as directed  Follow-up with Dr. Merida    Diagnosis: Fever  Assessment and Plan of Treatment: Resolved  Likely due to enterorhino virus  Supportive care  Follow-up with your Primary Care Doctor    Diagnosis: Atrial fibrillation  Assessment and Plan of Treatment: Atrial fibrillation is the most common heart rhythm problem.  The condition puts you at risk for has stroke and heart attack  You were started on blood thinners to prevent possible clot and stroke complications.   Monitor for any signs of bleeding and avoid injury while on this medication  If any bleeding persistent and symptomatic stop the medication and notify your doctor immediately.  It helps if you control your blood pressure, not drink more than 1-2 alcohol drinks per day, cut down on caffeine, getting treatment for over active thyroid gland, and get regular exercise  Call your doctor if you feel your heart racing or beating unusually, chest tightness or pain, lightheaded, faint, shortness of breath especially with exercise  It is important to take your heart medication as prescribed  You may be on anticoagulation which is very important to take as directed - you may need blood work to monitor drug levels

## 2024-05-04 NOTE — DISCHARGE NOTE NURSING/CASE MANAGEMENT/SOCIAL WORK - NSDCPEELIQUISDIET_GEN_ALL_CORE
Eat healthy foods you enjoy. Apixaban/Eliquis DOES NOT have a special diet. Limit your alcohol intake. mental health, substance abuse services Intake

## 2024-05-04 NOTE — DISCHARGE NOTE PROVIDER - PROVIDER TOKENS
PROVIDER:[TOKEN:[[id number]],FOLLOWUP:[1 week]],PROVIDER:[TOKEN:[[id number]],FOLLOWUP:[1 week]],PROVIDER:[TOKEN:[[id number]],FOLLOWUP:[1 week]]

## 2024-05-04 NOTE — PROGRESS NOTE ADULT - REASON FOR ADMISSION
near syncope

## 2024-05-04 NOTE — PROGRESS NOTE ADULT - ASSESSMENT
64 year old M w/  Afib, HTN, HLD, hematuria, s/p cystoscopy, laser enucleation of prostate with morcellation with Dr. Pema Navarro on 2/20/2024 presents with near syncope at doctor's office.           Patient was at his neurologist's office. Dr. Berry,  While sitting on the exam table, suddenly he felt the room closing on him, tunnel vision, and vision sight darkening, and lightheadedness and diaphoretic. His doctor lay him down on the exam table. He denies any loss of consciousness. He denies any chest pain/SOB, denies any heart palpitations prior to and after the near syncope episode. He said his doctor felt his pulses, and his pulses were going in and out x 3 times. He denies any recent fever, but has some runny nose, dry cough, and chills recently. no LOC, no incontienence no convulsions.            Of note he was diagnosed with UTI and finished antibiotics 1 week ago. Also he was found to have atrial fibrillation on March 25, 2024 and was prescribed eliquis,   MRI brain and MRA H/N neg for acute findigns   + orthostatics   NST abnormal   TTE neg EF 55%   o/e AAOx3, MORRIS cane at baseline     Impression   pre syncope/near syncope without LOC. possible vasovagal vs related to recent infection and hematuria.  also wtih + Afib     - [facility] 5/3   - eliquis held. now on asa and statin  - cardio following ; plan for cath  - + orthostatics.  trend   - outpatient EEG  - infectious workup., cx's sent.   - telemetry  - PT/OT  - check FS, glucose control <180  - GI/DVT ppx   - Thank you for allowing me to participate in the care of this patient. Call with questions.   - sees Dr. Berry outpatient   Jose J Osborne MD  Vascular Neurology  Office: [phone] 
64 year old M w/ PMH Afib, HTN, HLD presents with near syncope.       Problem/Plan - 1:  ·  Problem: Near syncope.   ·  Plan: - Differential includes cardiogenic syncope vs. infectious vs. orthostatic hypotension   - personally reviewed ECG normal sinus rhythm, with PVCs, no ST elevation   - ischemia mauricio as per cards   - infectious workup per ID  - cards fu     Problem/Plan - 2:  ·  Problem: Acute UTI.   ·  Plan: -  blood culture likely contaminant  - ID fu appreciated  - monitor off antibiotics     Problem/Plan - 3:  ·  Problem: Fever.   ·  Plan: - rectal temp 100.2F   - ID fu   - monitor cultures     Problem/Plan - 4:  ·  Problem: Hematuria.   ·  Plan: - He also noticed since he started eliquis he started having hematuria, and he was planned to have a cystoscopy outpatient   - UA noticed many RBC   - Hgb 13.2 at baseline on admission   - monitor     Problem/Plan - 5:  ·  Problem: Atrial fibrillation.   ·  Plan: - c/w home eliquis 5 BID  - c/w home metoprolol succinate ER 25mg daily.    Problem/Plan - 6:  ·  Problem: Lower extremity edema.   ·  Plan: - on exam noted bilateral lower extremity edema, R worse than L   - per patient and wife, patient has chronic LE edema, and R side always larger than L side  - f/u [facility] duplex bilateral lower extremities.    Problem/Plan - 7:  ·  Problem: Hypertension.   ·  Plan: - c/w home metoprolol succinate 25 daily.    Problem/Plan - 8:  ·  Problem: Preventive measure.   ·  Plan: - Diet : regular diet   - DVT prophylaxis: eliquis  - PT eval.  
64 year old M w/ PMH Afib, HTN, HLD presents with near syncope.       Problem/Plan - 1:  ·  Problem: Near syncope.   ·  Plan: - Differential includes cardiogenic syncope vs. infectious vs. orthostatic hypotension   - personally reviewed ECG normal sinus rhythm, with PVCs, no ST elevation   - ischemia mauricio as per cards   - infectious workup per below   - cards fu     Problem/Plan - 2:  ·  Problem: Acute UTI.   ·  Plan: -  blood culture likely contaminant  - ID fu appreciated  - monitor off antibiotics       Problem/Plan - 3:  ·  Problem: Fever.   ·  Plan: - rectal temp 100.2F   - ID fu   - monitor cultures     Problem/Plan - 4:  ·  Problem: Hematuria.   ·  Plan: - He also noticed since he started eliquis he started having hematuria, and he was planned to have a cystoscopy outpatient   - UA noticed many RBC   - Hgb 13.2 at baseline on admission   - monitor     Problem/Plan - 5:  ·  Problem: Atrial fibrillation.   ·  Plan: - c/w home eliquis 5 BID  - c/w home metoprolol succinate ER 25mg daily.    Problem/Plan - 6:  ·  Problem: Lower extremity edema.   ·  Plan: - on exam noted bilateral lower extremity edema, R worse than L   - per patient and wife, patient has chronic LE edema, and R side always larger than L side  - f/u [facility] duplex bilateral lower extremities.    Problem/Plan - 7:  ·  Problem: Hypertension.   ·  Plan: - c/w home metoprolol succinate 25 daily.    Problem/Plan - 8:  ·  Problem: Preventive measure.   ·  Plan: - Diet : regular diet   - DVT prophylaxis: eliquis  - PT eval.
64M with Afib, HTN, HLD, known left sided nephrolithiasis, BPH with urinary obstruction requiring a catheter now s/p cystoscopy, laser enucleation of prostate with morcellation and removal of bladder stone (2/20/2024) complicated by UTI (Enterobacter Cloacae and Pseudomonas) treated with ciprofloxacin.  Recent diagnosis of left epididymitis treated with 2 weeks of ciprofloxacin early April 2024.  Now presenting 4/29 with near syncope at doctor's office. Tmax 100.2 (rectal). WBC 8.29.  UA with pyuria.  BC drawn with CoNS in one set and UC pending.  CXR (-).    Patient with low grade fever, nasal congestion / URI with asymptomatic bacteriuria  - CoNS is likely a procurement contaminant  - repeat BC sent, pending  - RVP +entero / rhino  - pyuria but no symptoms and known chronic kidney stones  - monitor off antibiotics  - check procalcitonin
Near syncope   ? due to sepsis vs vagal reaction   monitor on tele   correlated symptoms with episodes of afib  obtain echo   obtain pharm nuclear stress test     PAF  cont nadolol   on a/c  had mild hematuria planned for cystoscopy in near future    HTN  labile     bacteremia   work up as per ID      Advanced care planning was discussed with patient and family.  Risks, benefits and alternatives of the cardiac treatments and medical therapy including procedures were discussed in detail and all questions were answered. Importance of compliance with medical therapy and lifestyle modification to improve cardiovascular health were addressed. Appropriate forms and patient educational materials were reviewed. 30 minutes face to face spent.  
Near syncope   ? due to sepsis vs vagal reaction   monitor on tele   correlated symptoms with episodes of afib  stress test is abn for ischemia  will plan for cath today   start low dose asa for no w  a/c on hold for cath   resume after     PAF  cont nadolol   on a/c  had mild hematuria planned for cystoscopy in near future    HTN  labile     bacteremia   work up as per ID   
Near syncope   ? due to sepsis vs vagal reaction   monitor on tele     CAD  stress test is abn for ischemia  cath shows severe prox and mid LAD disease s/p PCI   cont asa, DC after one week   cont plavix  on a/c with Eliquis     PAF  cont nadolol   on a/c    HTN  labile     DC planning as per primary team   can go home on current meds  D/C asa after one week   pt has appt with me this coming Tuesday   
64 year old M w/ PMH Afib, HTN, HLD presents with near syncope.       Problem/Plan - 1:  ·  Problem: Near syncope.   ·  Plan: - Differential includes cardiogenic syncope vs. infectious vs. orthostatic hypotension   - personally reviewed ECG normal sinus rhythm, with PVCs, no ST elevation   - ischemia mauricio as per cards   - infectious workup per ID  - cards fu     Problem/Plan - 2:  ·  Problem: Acute UTI.   ·  Plan: -  blood culture likely contaminant  - ID fu appreciated  - monitor off antibiotics     Problem/Plan - 3:  ·  Problem: Fever.   ·  Plan: - rectal temp 100.2F   - ID fu   - monitor cultures     Problem/Plan - 4:  ·  Problem: Hematuria.   ·  Plan: - He also noticed since he started eliquis he started having hematuria, and he was planned to have a cystoscopy outpatient   - UA noticed many RBC   - Hgb 13.2 at baseline on admission   - monitor     Problem/Plan - 5:  ·  Problem: Atrial fibrillation.   ·  Plan: - c/w home eliquis 5 BID  - c/w home metoprolol succinate ER 25mg daily.    Problem/Plan - 6:  ·  Problem: Lower extremity edema.   ·  Plan: - on exam noted bilateral lower extremity edema, R worse than L   - per patient and wife, patient has chronic LE edema, and R side always larger than L side  - f/u [facility] duplex bilateral lower extremities.    Problem/Plan - 7:  ·  Problem: Hypertension.   ·  Plan: - c/w home metoprolol succinate 25 daily.    Problem/Plan - 8:  ·  Problem: Preventive measure.   ·  Plan: - Diet : regular diet   - DVT prophylaxis: eliquis  - PT eval.
Near syncope   ? due to sepsis vs vagal reaction   monitor on tele   correlated symptoms with episodes of afib  stress test is abn for ischemia  will plan for cath tomorrow  start low dose asa for no w  a/c on hold for cath     PAF  cont nadolol   on a/c  had mild hematuria planned for cystoscopy in near future    HTN  labile     bacteremia   work up as per ID   
64 year old M w/ PMH Afib, HTN, HLD presents with near syncope.       Problem/Plan - 1:  ·  Problem: Near syncope.   ·  Plan: - Differential includes cardiogenic syncope vs. infectious vs. orthostatic hypotension   - personally reviewed ECG normal sinus rhythm, with PVCs, no ST elevation   - ischemia mauricio as per cards   - infectious workup per ID  - cards fu   - cath today     Problem/Plan - 2:  ·  Problem: Acute UTI.   ·  Plan: -  blood culture likely contaminant  - ID fu appreciated  - monitor off antibiotics     Problem/Plan - 3:  ·  Problem: Fever.   ·  Plan: - rectal temp 100.2F   - ID fu   - monitor cultures     Problem/Plan - 4:  ·  Problem: Hematuria.   ·  Plan: - He also noticed since he started eliquis he started having hematuria, and he was planned to have a cystoscopy outpatient   - UA noticed many RBC   - Hgb 13.2 at baseline on admission   - monitor     Problem/Plan - 5:  ·  Problem: Atrial fibrillation.   ·  Plan: - c/w home eliquis 5 BID  - c/w home metoprolol succinate ER 25mg daily.    Problem/Plan - 6:  ·  Problem: Lower extremity edema.   ·  Plan: - on exam noted bilateral lower extremity edema, R worse than L   - per patient and wife, patient has chronic LE edema, and R side always larger than L side  - f/u [facility] duplex bilateral lower extremities.    Problem/Plan - 7:  ·  Problem: Hypertension.   ·  Plan: - c/w home metoprolol succinate 25 daily.    Problem/Plan - 8:  ·  Problem: Preventive measure.   ·  Plan: - Diet : regular diet   - DVT prophylaxis: eliquis  - PT jorge.

## 2024-05-04 NOTE — DISCHARGE NOTE PROVIDER - CARE PROVIDER_API CALL
Daniel Merida  Cardiovascular Disease  [address], [address]  [place] [address]  Phone: [phone]  Fax: [phone]  Follow Up Time: 1 week    Juaquin Wood  Neurology  [address], [address]  [place] [address]  Phone: [phone]  Fax: [phone]  Follow Up Time: 1 week    Saeid Lara  NP in Family Health  [address], [address]  [place] [address]  Phone: [phone]  Fax: [phone]  Follow Up Time: 1 week

## 2024-05-04 NOTE — DISCHARGE NOTE PROVIDER - NSDCFUSCHEDAPPT_GEN_ALL_CORE_FT
Pema Navarro  [facility] Physician formerly [facility]  UROLOGY [address]  Scheduled Appointment: 07/01/2024

## 2024-05-04 NOTE — PROGRESS NOTE ADULT - SUBJECTIVE AND OBJECTIVE BOX
Subjective: Patient seen and examined. No new events except as noted.     SUBJECTIVE/ROS:  feels well   No chest pain, dyspnea, palpitation, or dizziness.       MEDICATIONS:  MEDICATIONS  (STANDING):  apixaban 5 milliGRAM(s) Oral every 12 hours  aspirin enteric coated 81 milliGRAM(s) Oral daily  atorvastatin 40 milliGRAM(s) Oral at bedtime  chlorhexidine 2% Cloths 1 Application(s) Topical daily  clopidogrel Tablet 75 milliGRAM(s) Oral daily  nadolol 20 milliGRAM(s) Oral daily  sodium chloride 0.9%. 250 milliLiter(s) (75 mL/Hr) IV Continuous <Continuous>  sodium chloride 0.9%. 1000 milliLiter(s) (100 mL/Hr) IV Continuous <Continuous>      PHYSICAL EXAM:  T(C): 36.9 (05-04-24 @ 04:18), Max: 36.9 (05-04-24 @ 04:18)  HR: 60 (05-04-24 @ 04:18) (54 - 69)  BP: 130/78 (05-04-24 @ 04:18) (118/71 - 194/97)  RR: 18 (05-04-24 @ 04:18) (10 - 18)  SpO2: 98% (05-04-24 @ 04:18) (98% - 100%)  Wt(kg): --  I&O's Summary    02 May 2024 07:01  -  03 May 2024 07:00  --------------------------------------------------------  IN: 1640 mL / OUT: 0 mL / NET: 1640 mL    03 May 2024 07:01  -  04 May 2024 06:31  --------------------------------------------------------  IN: 720 mL / OUT: 1750 mL / NET: -1030 mL            JVP: Normal  Neck: supple  Lung: clear   CV: S1 S2 , Murmur:  Abd: soft  Ext: No edema  neuro: Awake / alert  Psych: flat affect  Skin: normal``    LABS/DATA:    CARDIAC MARKERS:                  proBNP:   Lipid Profile:   HgA1c:   TSH:     TELE:  EKG:

## 2024-05-04 NOTE — PROGRESS NOTE ADULT - SUBJECTIVE AND OBJECTIVE BOX
Interventional Cardiology Post Cath Progress Note:                Subjective:   Patient feels well- no current complaints- Denies chest pain, shortness of breath. Denies pain, numbness, tingling or swelling around right radial access site    Tele 24hrs: NSR      MEDICATIONS  (STANDING):  apixaban 5 milliGRAM(s) Oral every 12 hours  aspirin enteric coated 81 milliGRAM(s) Oral daily  atorvastatin 40 milliGRAM(s) Oral at bedtime  chlorhexidine 2% Cloths 1 Application(s) Topical daily  clopidogrel Tablet 75 milliGRAM(s) Oral daily  nadolol 20 milliGRAM(s) Oral daily  sodium chloride 0.9%. 1000 milliLiter(s) (100 mL/Hr) IV Continuous <Continuous>  sodium chloride 0.9%. 250 milliLiter(s) (75 mL/Hr) IV Continuous <Continuous>    MEDICATIONS  (PRN):  acetaminophen     Tablet .. 650 milliGRAM(s) Oral every 6 hours PRN Temp greater or equal to 38C (100.4F), Mild Pain (1 - 3)  melatonin 3 milliGRAM(s) Oral at bedtime PRN Insomnia      Objective:  Vital Signs Last 24 Hrs  T(C): 36.9 (04 May 2024 04:18), Max: 36.9 (04 May 2024 04:18)  T(F): 98.4 (04 May 2024 04:18), Max: 98.4 (04 May 2024 04:18)  HR: 60 (04 May 2024 04:18) (54 - 69)  BP: 130/78 (04 May 2024 04:18) (118/71 - 194/97)  BP(mean): --  RR: 18 (04 May 2024 04:18) (10 - 18)  SpO2: 98% (04 May 2024 04:18) (98% - 100%)    Parameters below as of 04 May 2024 04:18  Patient On (Oxygen Delivery Method): room air        05-03-24 @ 07:01  -  05-04-24 @ 07:00  --------------------------------------------------------  IN: 720 mL / OUT: 1750 mL / NET: -1030 mL    05-04-24 @ 07:01  -  05-04-24 @ 10:59  --------------------------------------------------------  IN: 240 mL / OUT: 0 mL / NET: 240 mL                              12.5   6.29  )-----------( 233      ( 04 May 2024 07:11 )             38.6     05-04    139  |  104  |  30<H>  ----------------------------<  79  4.2   |  24  |  1.21    Ca    8.8      04 May 2024 07:11        Urinalysis Basic - ( 04 May 2024 07:11 )    Color: x / Appearance: x / SG: x / pH: x  Gluc: 79 mg/dL / Ketone: x  / Bili: x / Urobili: x   Blood: x / Protein: x / Nitrite: x   Leuk Esterase: x / RBC: x / WBC x   Sq Epi: x / Non Sq Epi: x / Bacteria: x      Physical Exam:  No apparent distress, alert and oriented times three, appropriate affect  Right Upper Extremity: Soft, non tender, no bleeding or hematoma, clean/dry/intact- RUE +2 palpable radial pulse      5/3: s/p PCI to pLAD x1 DELIA and DELIA x1 to mLAD x1 via RRA    - right radial artery site is stable.   - Continue DAPT (aspirin 81mg and clopidogrel 75mg) and eliquis. Stop ASA after 1 week post procedure  - Continue statin  - Recommend a heart healthy diet   - Avoid using NSAIDs  (Aleve, Motrin, ibuprofen, naproxen) while on DAPT, please utilize Tylenol for pain control (not to exceed 4gm in 24 hours)  - Patient aware to take DAPT  as prescribed and DO NOT STOP taking without consulting cardiologist first or STENT/s WILL CLOSE  - Reviewed and reinforced with patient:  site complications ( eg: bleeding, excruciating pain at the procedural site, large swelling ball size-  extremity numbness, tingling, temperature change), or CHEST PAIN; pt aware that if any of those occur he/she must call cardiologist IMMEDIATELY or 911 or go to nearest emergency room   - Patient verbalizes understanding of ALL OF THE ABOVE, and gives positive feedback   -please make sure DAPT is prescribed to pt's preferred pharmacy on dc   -f/u appt in 2 weeks post dc with outpt cardiologist  - Keep Mg >2 K>4   - cont to monitor on tele  - all other care as per primary   - followed by Dr Meriad's team

## 2024-05-04 NOTE — DISCHARGE NOTE PROVIDER - NSDCMRMEDTOKEN_GEN_ALL_CORE_FT
atorvastatin 10 mg oral tablet: 1 tab(s) orally once a day  Eliquis 5 mg oral tablet: 1 tab(s) orally 2 times a day  metoprolol succinate 25 mg oral tablet, extended release: 1 tab(s) orally once a day   aspirin 81 mg oral delayed release tablet: 1 tab(s) orally once a day Stop aspirin on 5/10/24  atorvastatin 40 mg oral tablet: 1 tab(s) orally once a day (at bedtime)  clopidogrel 75 mg oral tablet: 1 tab(s) orally once a day  Eliquis 5 mg oral tablet: 1 tab(s) orally 2 times a day  nadolol 20 mg oral tablet: 1 tab(s) orally once a day   aspirin 81 mg oral delayed release tablet: 1 tab(s) orally once a day Stop aspirin on 5/10/24  atorvastatin 40 mg oral tablet: 1 tab(s) orally once a day (at bedtime)  clopidogrel 75 mg oral tablet: 1 tab(s) orally once a day  Eliquis 5 mg oral tablet: 1 tab(s) orally 2 times a day  nadolol 20 mg oral tablet: 1 tab(s) orally once a day  Physical Therapy: Assess and Treat

## 2024-05-04 NOTE — DISCHARGE NOTE PROVIDER - CARE PROVIDERS DIRECT ADDRESSES
,DirectAddress_Unknown,[email or web address]

## 2024-05-06 LAB
CULTURE RESULTS: SIGNIFICANT CHANGE UP
CULTURE RESULTS: SIGNIFICANT CHANGE UP
SPECIMEN SOURCE: SIGNIFICANT CHANGE UP
SPECIMEN SOURCE: SIGNIFICANT CHANGE UP

## 2024-05-07 ENCOUNTER — NON-APPOINTMENT (OUTPATIENT)
Age: 64
End: 2024-05-07

## 2024-05-07 NOTE — PATIENT PROFILE ADULT - FALL HARM RISK - CONCLUSION
Fall with Harm Risk
Render Risk Assessment In Note?: no
Comment: Patient reports that he was using Ketoconazole for treatment of spot on back of scalp. Patient informed of cyst and recommended removal. \\nPatient declined removal at this time, patient advised to schedule appointment.
Detail Level: Simple

## 2024-05-13 ENCOUNTER — APPOINTMENT (OUTPATIENT)
Dept: INTERNAL MEDICINE | Facility: CLINIC | Age: 64
End: 2024-05-13
Payer: COMMERCIAL

## 2024-05-13 VITALS
HEART RATE: 63 BPM | WEIGHT: 233 LBS | HEIGHT: 76 IN | SYSTOLIC BLOOD PRESSURE: 130 MMHG | OXYGEN SATURATION: 98 % | BODY MASS INDEX: 28.37 KG/M2 | DIASTOLIC BLOOD PRESSURE: 80 MMHG

## 2024-05-13 DIAGNOSIS — N40.1 BENIGN PROSTATIC HYPERPLASIA WITH LOWER URINARY TRACT SYMPMS: ICD-10-CM

## 2024-05-13 DIAGNOSIS — N13.8 BENIGN PROSTATIC HYPERPLASIA WITH LOWER URINARY TRACT SYMPMS: ICD-10-CM

## 2024-05-13 DIAGNOSIS — I49.9 CARDIAC ARRHYTHMIA, UNSPECIFIED: ICD-10-CM

## 2024-05-13 DIAGNOSIS — I35.0 NONRHEUMATIC AORTIC (VALVE) STENOSIS: ICD-10-CM

## 2024-05-13 PROCEDURE — 99495 TRANSJ CARE MGMT MOD F2F 14D: CPT

## 2024-05-13 PROCEDURE — 99214 OFFICE O/P EST MOD 30 MIN: CPT

## 2024-05-13 RX ORDER — TAMSULOSIN HYDROCHLORIDE 0.4 MG/1
0.4 CAPSULE ORAL
Qty: 30 | Refills: 2 | Status: DISCONTINUED | COMMUNITY
Start: 2023-09-25 | End: 2024-05-13

## 2024-05-13 RX ORDER — CIPROFLOXACIN HYDROCHLORIDE 500 MG/1
500 TABLET, FILM COATED ORAL
Qty: 28 | Refills: 0 | Status: DISCONTINUED | COMMUNITY
Start: 2024-04-01 | End: 2024-05-13

## 2024-05-13 RX ORDER — NADOLOL 20 MG/1
20 TABLET ORAL DAILY
Qty: 90 | Refills: 0 | Status: ACTIVE | COMMUNITY
Start: 2024-05-13

## 2024-05-13 RX ORDER — CLOPIDOGREL BISULFATE 75 MG/1
75 TABLET, FILM COATED ORAL
Qty: 90 | Refills: 0 | Status: ACTIVE | COMMUNITY
Start: 2024-05-13

## 2024-05-13 RX ORDER — FLUCONAZOLE 100 MG/1
100 TABLET ORAL DAILY
Qty: 7 | Refills: 0 | Status: DISCONTINUED | COMMUNITY
Start: 2024-01-22 | End: 2024-05-13

## 2024-05-13 RX ORDER — CIPROFLOXACIN HYDROCHLORIDE 500 MG/1
500 TABLET, FILM COATED ORAL
Qty: 14 | Refills: 0 | Status: DISCONTINUED | COMMUNITY
Start: 2024-01-22 | End: 2024-05-13

## 2024-05-13 RX ORDER — AMOXICILLIN AND CLAVULANATE POTASSIUM 500; 125 MG/1; MG/1
500-125 TABLET, FILM COATED ORAL
Qty: 14 | Refills: 0 | Status: DISCONTINUED | COMMUNITY
Start: 2023-09-25 | End: 2024-05-13

## 2024-05-13 RX ORDER — ATORVASTATIN CALCIUM 40 MG/1
40 TABLET, FILM COATED ORAL
Qty: 90 | Refills: 0 | Status: ACTIVE | COMMUNITY
Start: 2023-11-20 | End: 1900-01-01

## 2024-05-13 RX ORDER — FINASTERIDE 5 MG/1
5 TABLET, FILM COATED ORAL
Qty: 30 | Refills: 6 | Status: DISCONTINUED | COMMUNITY
Start: 2023-10-16 | End: 2024-05-13

## 2024-05-13 RX ORDER — APIXABAN 5 MG/1
5 TABLET, FILM COATED ORAL
Qty: 180 | Refills: 1 | Status: ACTIVE | COMMUNITY
Start: 2024-05-13

## 2024-05-13 NOTE — HISTORY OF PRESENT ILLNESS
[Post-hospitalization from ___ Hospital] : Post-hospitalization from [unfilled] Hospital [Admitted on: ___] : The patient was admitted on [unfilled] [Discharged on ___] : discharged on [unfilled] [FreeTextEntry2] : PY HAD SYNCOPE AT NUROLOGY OFFICE AND WAS SENT ER   Patient was at his neurologist's office. While sitting on the exam table, suddenly he felt the room closing on him, tunnel vision, and vision sight darkening, and lightheadedness. pt had 50-69% stenosis in the right prox ICA.  # PT 2 STENTS IMPLANTS  SAW CARDIOLOGY LAST WEEK GOING BACK IN 2 MONTHS TO F/U WITH CARDIOLOGY  # S/O PROSTATE SURGERY 2/21/2024 DOING WELL NOW F/U UROLOGY LEFT KIDNEY STONE BORN WITH ONLY ONE KIDNEY

## 2024-05-13 NOTE — PLAN
[FreeTextEntry1] : MEDICATION RECONSOLOATION DONE F/U 1 MONTH F/U CARDIOLOGY F/U UROLOGY F/U NEUROLOGY

## 2024-05-20 ENCOUNTER — APPOINTMENT (OUTPATIENT)
Dept: UROLOGY | Facility: CLINIC | Age: 64
End: 2024-05-20
Payer: COMMERCIAL

## 2024-05-20 ENCOUNTER — OUTPATIENT (OUTPATIENT)
Dept: OUTPATIENT SERVICES | Facility: HOSPITAL | Age: 64
LOS: 1 days | End: 2024-05-20
Payer: COMMERCIAL

## 2024-05-20 DIAGNOSIS — R35.0 FREQUENCY OF MICTURITION: ICD-10-CM

## 2024-05-20 DIAGNOSIS — R31.0 GROSS HEMATURIA: ICD-10-CM

## 2024-05-20 DIAGNOSIS — N20.0 CALCULUS OF KIDNEY: ICD-10-CM

## 2024-05-20 DIAGNOSIS — Z79.01 LONG TERM (CURRENT) USE OF ANTICOAGULANTS: ICD-10-CM

## 2024-05-20 PROCEDURE — 99213 OFFICE O/P EST LOW 20 MIN: CPT | Mod: 25

## 2024-05-20 PROCEDURE — 52000 CYSTOURETHROSCOPY: CPT | Mod: 58

## 2024-05-20 PROCEDURE — 52000 CYSTOURETHROSCOPY: CPT

## 2024-05-20 NOTE — HISTORY OF PRESENT ILLNESS
[FreeTextEntry1] : 64 year old M w/ PMH Afib, HTN, HLD s/p ThuLEP Feb 20, 2024 pathology = 75 grams benign voiding with strong stream, no hesitancy, urgency incontinence improving recently treated for right epid/orch pain and swelling has resolved hematuria is improving as well Recent hospitalization for near syncope from 4/29/24 to 5/4/24 at [facility]--Stress test showed ischemia and he had PCI with two stents placed for severe prox and mid LAD disease.  He is now on plavix for the cardiac stents and also on Eliquis for afib.  Has solitary kidney and multiple renal stones Needs LEFT PCNL  Brought in today for cystoscopy to eval LUTS/hematuria

## 2024-05-20 NOTE — ASSESSMENT
[FreeTextEntry1] : cystoscopy today negative for bladder tumors, prostatic fossa is healing well, still some fibrinous tissue anterolaterally on both sides at bladder neck area that will slough off in due course. Bladder neck is wide open, sphincter is competent Urinalysis, culture and sensitivities and urine cytology sent.  Will hold off on PCNL until at least 6 months on Plavix and Eliquis.  If he becomes symptomatic on the stones, then we may need to intervene sooner  See me in 6 months for follow up CT will be ordered at that time

## 2024-05-21 DIAGNOSIS — R31.0 GROSS HEMATURIA: ICD-10-CM

## 2024-05-21 DIAGNOSIS — N20.0 CALCULUS OF KIDNEY: ICD-10-CM

## 2024-05-21 DIAGNOSIS — Z79.01 LONG TERM (CURRENT) USE OF ANTICOAGULANTS: ICD-10-CM

## 2024-05-21 LAB
APPEARANCE: ABNORMAL
BACTERIA: NEGATIVE /HPF
BILIRUBIN URINE: NEGATIVE
BLOOD URINE: ABNORMAL
CAST: 0 /LPF
COLOR: YELLOW
EPITHELIAL CELLS: 1 /HPF
GLUCOSE QUALITATIVE U: NEGATIVE MG/DL
KETONES URINE: NEGATIVE MG/DL
LEUKOCYTE ESTERASE URINE: ABNORMAL
MICROSCOPIC-UA: NORMAL
NITRITE URINE: NEGATIVE
PH URINE: 5.5
PROTEIN URINE: NORMAL MG/DL
RED BLOOD CELLS URINE: 89 /HPF
SPECIFIC GRAVITY URINE: 1.02
UROBILINOGEN URINE: 0.2 MG/DL
WHITE BLOOD CELLS URINE: 73 /HPF

## 2024-05-22 LAB
BACTERIA UR CULT: ABNORMAL
URINE CYTOLOGY: NORMAL

## 2024-07-01 ENCOUNTER — APPOINTMENT (OUTPATIENT)
Dept: UROLOGY | Facility: CLINIC | Age: 64
End: 2024-07-01

## 2024-09-29 ENCOUNTER — NON-APPOINTMENT (OUTPATIENT)
Age: 64
End: 2024-09-29

## 2024-09-30 ENCOUNTER — APPOINTMENT (OUTPATIENT)
Dept: UROLOGY | Facility: CLINIC | Age: 64
End: 2024-09-30
Payer: COMMERCIAL

## 2024-09-30 ENCOUNTER — NON-APPOINTMENT (OUTPATIENT)
Age: 64
End: 2024-09-30

## 2024-09-30 VITALS
TEMPERATURE: 97.6 F | BODY MASS INDEX: 28.62 KG/M2 | WEIGHT: 235 LBS | DIASTOLIC BLOOD PRESSURE: 79 MMHG | SYSTOLIC BLOOD PRESSURE: 175 MMHG | HEART RATE: 56 BPM | RESPIRATION RATE: 17 BRPM | HEIGHT: 76 IN

## 2024-09-30 DIAGNOSIS — R33.9 RETENTION OF URINE, UNSPECIFIED: ICD-10-CM

## 2024-09-30 DIAGNOSIS — Z90.5 ACQUIRED ABSENCE OF KIDNEY: ICD-10-CM

## 2024-09-30 DIAGNOSIS — N40.1 BENIGN PROSTATIC HYPERPLASIA WITH LOWER URINARY TRACT SYMPMS: ICD-10-CM

## 2024-09-30 DIAGNOSIS — N20.0 CALCULUS OF KIDNEY: ICD-10-CM

## 2024-09-30 DIAGNOSIS — N13.8 BENIGN PROSTATIC HYPERPLASIA WITH LOWER URINARY TRACT SYMPMS: ICD-10-CM

## 2024-09-30 PROCEDURE — 99215 OFFICE O/P EST HI 40 MIN: CPT

## 2024-09-30 NOTE — ASSESSMENT
[FreeTextEntry1] : (1) kidney stones CT ordered in planning for PCNL  Discussed treatment options for stones and solitary left kidney Given the large volume of stones, it is best treated with percutaneous nephrolithotomy.  Based on the appearance of the stones in the calyceal angles, he may require 2 punctures. Potential complications of bleeding, transfusion, selective angioembolization if indicated, adjacent organ injury, fever, sepsis, infection, incomplete stone clearance, bowel or other unusual injury, chest complications, vascular injuries, procedures needed to address any complications, ureteral injury, anesthetic complications, all discussed.  He will need to be discharged with a ureteral stent in place for about 1 to 2 weeks given that this is a solitary kidney. He will need cardiac clearance and will have to interrupt his anticoagulation perioperatively.  (2) BPH, incontinence Kegel exercises described.  Printed guide given to patient. PSA today  Uroflow and PVR next visit PSA next visit Follow-up in 3 months

## 2024-09-30 NOTE — HISTORY OF PRESENT ILLNESS
[FreeTextEntry1] : 64 year old man w/ PMH Afib, HTN, HLD, CAD, s/p Coronary stents s/p ThuLEP Feb 20, 2024 pathology = 75 grams benign voiding with strong stream, no hesitancy, urgency incontinence improving--- occasional mild stress leakage--not a significant amount Treated for Right epid/orch on prior visits Remains on Plavix for coronary stents and Eliquis for Afib started April 2024    (1) BPH: Uroflow peak flow 11.3 cc/s voided vol 132 cc PVR 0 cc  (2) Kidney stones/Solitary kidney: Has solitary kidney and multiple renal stones Needs LEFT PCNL Holding off until he has been on Plavix for at least 6 months  Cystoscopy last visit for gross hematuria evaluation: no concerning lesions

## 2024-10-05 ENCOUNTER — APPOINTMENT (OUTPATIENT)
Dept: CT IMAGING | Facility: IMAGING CENTER | Age: 64
End: 2024-10-05
Payer: COMMERCIAL

## 2024-10-05 ENCOUNTER — OUTPATIENT (OUTPATIENT)
Dept: OUTPATIENT SERVICES | Facility: HOSPITAL | Age: 64
LOS: 1 days | End: 2024-10-05
Payer: COMMERCIAL

## 2024-10-05 DIAGNOSIS — N20.0 CALCULUS OF KIDNEY: ICD-10-CM

## 2024-10-05 DIAGNOSIS — Z00.8 ENCOUNTER FOR OTHER GENERAL EXAMINATION: ICD-10-CM

## 2024-10-05 PROCEDURE — 74176 CT ABD & PELVIS W/O CONTRAST: CPT

## 2024-10-05 PROCEDURE — 74176 CT ABD & PELVIS W/O CONTRAST: CPT | Mod: 26

## 2024-10-30 ENCOUNTER — NON-APPOINTMENT (OUTPATIENT)
Age: 64
End: 2024-10-30

## 2025-02-04 NOTE — PATIENT PROFILE ADULT - FOOD INSECURITY
Continue Regimen: Desonide 0.05% cream BID to AA on face Detail Level: Zone Render In Strict Bullet Format?: No Continue Regimen: Ketoconazole 2% shampoo scalp twice weekly for maintenance \\nClobetasol 0.05% solution BID to scalp for flares Continue Regimen: Desonide topical cream apply to affected areas ( face ) BID / PRN and Ketoconazole 2% shampoo use as directed QOD Initiate Treatment: hydroquinone 4 % topical cream Apply only to affected areas of hyperpigmentation at night for 2 months on, 1 month off. no

## 2025-04-10 ENCOUNTER — APPOINTMENT (OUTPATIENT)
Dept: VASCULAR SURGERY | Facility: CLINIC | Age: 65
End: 2025-04-10

## 2025-05-07 NOTE — H&P PST ADULT - HEART RATE (BEATS/MIN)
Copied from CRM #[id number]. Topic: MW Schedule Appointment  >> May 7, 2025  5:01 PM Ger RABAGO wrote:  MARCUS LOVING called to schedule, cancel or reschedule a Colonoscopy.   [facility] DOES NOT Schedule/Reschedule, Followed KB instructions to message or transfer.    -- DO NOT REPLY / DO NOT REPLY ALL --  -- This inbox is not monitored. If this was sent to the wrong provider or department, reroute message to P [facility] Reroute pool. --  -- Message is from Engagement Center Operations ([facility]) --  Reason for Appointment Message: Screening Colonoscopy    Reason for Visit: COLONOSCOPY    Is the patient currently scheduled? No    Preferred time to be seen: ANY DAY [address]    Caller Information       Contact Date/Time Type Contact Phone/Fax    05/07/2025 04:51 PM CDT Phone (Incoming) Tyson Loving (Self) [phone] (H)            Alternative phone number: None    Can a detailed message be left?  Yes - Voicemail   Patient has been advised the message will be addressed within 2-3 business days           
79

## (undated) DEVICE — TUBING SET FOR SUCTION PUMP

## (undated) DEVICE — SOL IRR BAG NS 0.9% 3000ML

## (undated) DEVICE — GOWN XXXL

## (undated) DEVICE — SOL IRR BAG H2O 3000ML

## (undated) DEVICE — PACK CYSTO

## (undated) DEVICE — NSM-GU WOLF PIRANHA MORCELLATOR: Type: DURABLE MEDICAL EQUIPMENT

## (undated) DEVICE — SOL IRR POUR H2O 1500ML

## (undated) DEVICE — BAG URINE W METER 2L

## (undated) DEVICE — TUBING RANGER FLUID IRRIGATION SET DISP

## (undated) DEVICE — ELCTR PLASMA LOOP MEDIUM ANGLED 24FR 12-30 DEG

## (undated) DEVICE — POSITIONER FOAM HEADREST (PINK)

## (undated) DEVICE — LABELS BLANK W PEN

## (undated) DEVICE — DRAPE U 47X51" LF STERILE

## (undated) DEVICE — MEDELA OVERFLOW FILTER DISPOSABLE

## (undated) DEVICE — NDL COUNTER FOAM AND MAGNET 40-70

## (undated) DEVICE — TUBING SUCTION 20FT

## (undated) DEVICE — CANISTER DISPOSABLE THIN WALL 3000CC

## (undated) DEVICE — GOWN LG

## (undated) DEVICE — ELCTR PLASMA ROLLER 24FR 12-30 DEG

## (undated) DEVICE — GLV 8 PROTEXIS (WHITE)

## (undated) DEVICE — BLADE SURGICAL #11 CARBON

## (undated) DEVICE — FOLEY CATH 3-WAY 22FR 30CC LATEX HEMATURIA COUDE

## (undated) DEVICE — SYR LUER LOK 30CC

## (undated) DEVICE — CONTAINER TISSUE COLLECTING DISP

## (undated) DEVICE — PREP BETADINE KIT

## (undated) DEVICE — Device

## (undated) DEVICE — IRRIGATION TRAY W PISTON SYRINGE 60ML

## (undated) DEVICE — DRAPE LINGEMAN TUR

## (undated) DEVICE — VENODYNE/SCD SLEEVE CALF MEDIUM

## (undated) DEVICE — FOLEY HOLDER STATLOCK 3 WAY ADULT

## (undated) DEVICE — TUBING SET TUR BLADDER IRRIGATION Y-TYPE 81"

## (undated) DEVICE — BLADDER EVACUATOR ELLIK DISP STERILE

## (undated) DEVICE — WARMING BLANKET UPPER ADULT

## (undated) DEVICE — ELCTR PLASMA BUTTON OVAL 24FR 12-30 DEG

## (undated) DEVICE — FOLEY HOLDER STATLOCK 2 WAY ADULT

## (undated) DEVICE — POSITIONER FOAM EGG CRATE ULNAR 2PCS (PINK)